# Patient Record
Sex: MALE | Race: WHITE | Employment: FULL TIME | URBAN - METROPOLITAN AREA
[De-identification: names, ages, dates, MRNs, and addresses within clinical notes are randomized per-mention and may not be internally consistent; named-entity substitution may affect disease eponyms.]

---

## 2024-01-20 ENCOUNTER — OFFICE VISIT (OUTPATIENT)
Dept: URGENT CARE | Age: 49
End: 2024-01-20
Payer: COMMERCIAL

## 2024-01-20 VITALS
WEIGHT: 195 LBS | HEART RATE: 106 BPM | RESPIRATION RATE: 20 BRPM | BODY MASS INDEX: 27.92 KG/M2 | SYSTOLIC BLOOD PRESSURE: 145 MMHG | TEMPERATURE: 98.3 F | DIASTOLIC BLOOD PRESSURE: 78 MMHG | HEIGHT: 70 IN | OXYGEN SATURATION: 97 %

## 2024-01-20 DIAGNOSIS — K08.89 TOOTHACHE: Primary | ICD-10-CM

## 2024-01-20 PROCEDURE — G0382 LEV 3 HOSP TYPE B ED VISIT: HCPCS | Performed by: PHYSICIAN ASSISTANT

## 2024-01-20 RX ORDER — AMOXICILLIN AND CLAVULANATE POTASSIUM 875; 125 MG/1; MG/1
1 TABLET, FILM COATED ORAL EVERY 12 HOURS SCHEDULED
Qty: 20 TABLET | Refills: 0 | Status: SHIPPED | OUTPATIENT
Start: 2024-01-20 | End: 2024-01-30

## 2024-01-20 NOTE — PROGRESS NOTES
"[facility]        NAME: Moises Dunn is a 48 y.o. male  : 1975    MRN: [id number]  DATE: 2024  TIME: 11:17 AM    /78   Pulse (!) 106   Temp 98.3 °F (36.8 °C)   Resp 20   Ht 5' 10\" (1.778 m)   Wt 88.5 kg (195 lb)   SpO2 97%   BMI 27.98 kg/m²     Assessment and Plan   Toothache [K08.89]  1. Toothache  amoxicillin-clavulanate (AUGMENTIN) 875-125 mg per tablet            Patient Instructions       Follow up with PCP in 3-5 days.  Proceed to  ER if symptoms worsen.    Chief Complaint     Chief Complaint   Patient presents with    Dental Pain     Dental pain began yesterday         History of Present Illness       Pt with left lower dental pain and jaw swelling x 2-3 days    Dental Pain         Review of Systems   Review of Systems   Constitutional: Negative.    HENT:  Positive for dental problem.    Eyes: Negative.    Respiratory: Negative.     Cardiovascular: Negative.    Gastrointestinal: Negative.    Endocrine: Negative.    Genitourinary: Negative.    Musculoskeletal: Negative.    Skin: Negative.    Allergic/Immunologic: Negative.    Neurological: Negative.    Hematological: Negative.    Psychiatric/Behavioral: Negative.     All other systems reviewed and are negative.        Current Medications       Current Outpatient Medications:     amoxicillin-clavulanate (AUGMENTIN) 875-125 mg per tablet, Take 1 tablet by mouth every 12 (twelve) hours for 10 days, Disp: 20 tablet, Rfl: 0    Current Allergies     Allergies as of 2024    (No Known Allergies)            The following portions of the patient's history were reviewed and updated as appropriate: allergies, current medications, past family history, past medical history, past social history, past surgical history and problem list.     History reviewed. No pertinent past medical history.    History reviewed. No pertinent surgical history.    History reviewed. No pertinent family history.      Medications have been " "verified.        Objective   /78   Pulse (!) 106   Temp 98.3 °F (36.8 °C)   Resp 20   Ht 5' 10\" (1.778 m)   Wt 88.5 kg (195 lb)   SpO2 97%   BMI 27.98 kg/m²        Physical Exam     Physical Exam  Vitals and nursing note reviewed.   Constitutional:       Appearance: Normal appearance. He is normal weight.   HENT:      Head: Normocephalic and atraumatic.      Right Ear: Tympanic membrane, ear canal and external ear normal.      Left Ear: Tympanic membrane, ear canal and external ear normal.      Nose: Nose normal.      Mouth/Throat:      Mouth: Mucous membranes are moist.      Pharynx: Oropharynx is clear.      Comments: Left lower molar tender  gum pain and swelling  ,+jaw swelling left, floor mouth wnl no neck pain or swelling no trismus voice wnl   Eyes:      Extraocular Movements: Extraocular movements intact.      Conjunctiva/sclera: Conjunctivae normal.      Pupils: Pupils are equal, round, and reactive to light.   Cardiovascular:      Rate and Rhythm: Normal rate and regular rhythm.      Heart sounds: Normal heart sounds.   Pulmonary:      Effort: Pulmonary effort is normal.      Breath sounds: Normal breath sounds.   Musculoskeletal:         General: Normal range of motion.      Cervical back: Normal range of motion and neck supple.   Skin:     General: Skin is warm.      Capillary Refill: Capillary refill takes less than 2 seconds.   Neurological:      Mental Status: He is alert and oriented to person, place, and time.                     "

## 2024-06-15 ENCOUNTER — HOSPITAL ENCOUNTER (INPATIENT)
Facility: HOSPITAL | Age: 49
LOS: 2 days | Discharge: HOME | DRG: 174 | End: 2024-06-17
Attending: EMERGENCY MEDICINE | Admitting: INTERNAL MEDICINE
Payer: COMMERCIAL

## 2024-06-15 ENCOUNTER — OFFICE VISIT (OUTPATIENT)
Dept: URGENT CARE | Age: 49
End: 2024-06-15

## 2024-06-15 ENCOUNTER — APPOINTMENT (EMERGENCY)
Dept: RADIOLOGY | Facility: HOSPITAL | Age: 49
DRG: 174 | End: 2024-06-15
Payer: COMMERCIAL

## 2024-06-15 VITALS
DIASTOLIC BLOOD PRESSURE: 124 MMHG | HEART RATE: 104 BPM | RESPIRATION RATE: 22 BRPM | SYSTOLIC BLOOD PRESSURE: 176 MMHG | OXYGEN SATURATION: 100 %

## 2024-06-15 DIAGNOSIS — R07.9 CHEST PAIN, UNSPECIFIED TYPE: Primary | ICD-10-CM

## 2024-06-15 DIAGNOSIS — R00.0 TACHYCARDIA: ICD-10-CM

## 2024-06-15 DIAGNOSIS — I21.4 NSTEMI (NON-ST ELEVATED MYOCARDIAL INFARCTION) (HCC): ICD-10-CM

## 2024-06-15 DIAGNOSIS — R79.89 ELEVATED TROPONIN: ICD-10-CM

## 2024-06-15 DIAGNOSIS — I16.1 HYPERTENSIVE EMERGENCY: ICD-10-CM

## 2024-06-15 DIAGNOSIS — I21.02 ST ELEVATION MYOCARDIAL INFARCTION INVOLVING LEFT ANTERIOR DESCENDING (LAD) CORONARY ARTERY (HCC): ICD-10-CM

## 2024-06-15 DIAGNOSIS — R07.9 CHEST PAIN: Primary | ICD-10-CM

## 2024-06-15 PROBLEM — D72.829 LEUKOCYTOSIS: Status: ACTIVE | Noted: 2024-06-15

## 2024-06-15 PROBLEM — Z72.0 TOBACCO ABUSE: Status: ACTIVE | Noted: 2024-06-15

## 2024-06-15 PROBLEM — R74.01 ELEVATED AST (SGOT): Status: ACTIVE | Noted: 2024-06-15

## 2024-06-15 LAB
2HR DELTA HS TROPONIN: 4655 NG/L
4HR DELTA HS TROPONIN: ABNORMAL NG/L
ALBUMIN SERPL BCP-MCNC: 5.2 G/DL (ref 3.5–5)
ALP SERPL-CCNC: 84 U/L (ref 34–104)
ALT SERPL W P-5'-P-CCNC: 21 U/L (ref 7–52)
ANION GAP SERPL CALCULATED.3IONS-SCNC: 10 MMOL/L (ref 4–13)
AST SERPL W P-5'-P-CCNC: 61 U/L (ref 13–39)
BASOPHILS # BLD AUTO: 0.04 THOUSANDS/ÂΜL (ref 0–0.1)
BASOPHILS NFR BLD AUTO: 0 % (ref 0–1)
BILIRUB SERPL-MCNC: 1.47 MG/DL (ref 0.2–1)
BUN SERPL-MCNC: 18 MG/DL (ref 5–25)
CALCIUM SERPL-MCNC: 10.4 MG/DL (ref 8.4–10.2)
CARDIAC TROPONIN I PNL SERPL HS: 2154 NG/L
CARDIAC TROPONIN I PNL SERPL HS: 6809 NG/L
CARDIAC TROPONIN I PNL SERPL HS: ABNORMAL NG/L
CHLORIDE SERPL-SCNC: 97 MMOL/L (ref 96–108)
CO2 SERPL-SCNC: 26 MMOL/L (ref 21–32)
CREAT SERPL-MCNC: 1.03 MG/DL (ref 0.6–1.3)
CRP SERPL HS-MCNC: 7.91 MG/L
EOSINOPHIL # BLD AUTO: 0.05 THOUSAND/ÂΜL (ref 0–0.61)
EOSINOPHIL NFR BLD AUTO: 0 % (ref 0–6)
ERYTHROCYTE [DISTWIDTH] IN BLOOD BY AUTOMATED COUNT: 13.2 % (ref 11.6–15.1)
GFR SERPL CREATININE-BSD FRML MDRD: 85 ML/MIN/1.73SQ M
GLUCOSE SERPL-MCNC: 122 MG/DL (ref 65–140)
HCT VFR BLD AUTO: 51 % (ref 36.5–49.3)
HGB BLD-MCNC: 17.8 G/DL (ref 12–17)
IMM GRANULOCYTES # BLD AUTO: 0.07 THOUSAND/UL (ref 0–0.2)
IMM GRANULOCYTES NFR BLD AUTO: 0 % (ref 0–2)
KCT BLD-ACNC: 154 SEC (ref 89–137)
KCT BLD-ACNC: 281 SEC (ref 89–137)
LYMPHOCYTES # BLD AUTO: 2.34 THOUSANDS/ÂΜL (ref 0.6–4.47)
LYMPHOCYTES NFR BLD AUTO: 13 % (ref 14–44)
MCH RBC QN AUTO: 31.9 PG (ref 26.8–34.3)
MCHC RBC AUTO-ENTMCNC: 34.9 G/DL (ref 31.4–37.4)
MCV RBC AUTO: 91 FL (ref 82–98)
MONOCYTES # BLD AUTO: 1.01 THOUSAND/ÂΜL (ref 0.17–1.22)
MONOCYTES NFR BLD AUTO: 6 % (ref 4–12)
NEUTROPHILS # BLD AUTO: 14.64 THOUSANDS/ÂΜL (ref 1.85–7.62)
NEUTS SEG NFR BLD AUTO: 81 % (ref 43–75)
NRBC BLD AUTO-RTO: 0 /100 WBCS
PLATELET # BLD AUTO: 315 THOUSANDS/UL (ref 149–390)
PMV BLD AUTO: 10.8 FL (ref 8.9–12.7)
POTASSIUM SERPL-SCNC: 4 MMOL/L (ref 3.5–5.3)
PROT SERPL-MCNC: 8.8 G/DL (ref 6.4–8.4)
RBC # BLD AUTO: 5.58 MILLION/UL (ref 3.88–5.62)
SODIUM SERPL-SCNC: 133 MMOL/L (ref 135–147)
SPECIMEN SOURCE: ABNORMAL
SPECIMEN SOURCE: ABNORMAL
WBC # BLD AUTO: 18.15 THOUSAND/UL (ref 4.31–10.16)

## 2024-06-15 PROCEDURE — G0383 LEV 4 HOSP TYPE B ED VISIT: HCPCS | Performed by: EMERGENCY MEDICINE

## 2024-06-15 PROCEDURE — 93454 CORONARY ARTERY ANGIO S&I: CPT | Performed by: INTERNAL MEDICINE

## 2024-06-15 PROCEDURE — C1725 CATH, TRANSLUMIN NON-LASER: HCPCS | Performed by: INTERNAL MEDICINE

## 2024-06-15 PROCEDURE — 92941 PRQ TRLML REVSC TOT OCCL AMI: CPT | Performed by: INTERNAL MEDICINE

## 2024-06-15 PROCEDURE — NC001 PR NO CHARGE: Performed by: INTERNAL MEDICINE

## 2024-06-15 PROCEDURE — 99285 EMERGENCY DEPT VISIT HI MDM: CPT

## 2024-06-15 PROCEDURE — 84484 ASSAY OF TROPONIN QUANT: CPT | Performed by: EMERGENCY MEDICINE

## 2024-06-15 PROCEDURE — 93005 ELECTROCARDIOGRAM TRACING: CPT

## 2024-06-15 PROCEDURE — 36415 COLL VENOUS BLD VENIPUNCTURE: CPT | Performed by: EMERGENCY MEDICINE

## 2024-06-15 PROCEDURE — 96376 TX/PRO/DX INJ SAME DRUG ADON: CPT

## 2024-06-15 PROCEDURE — 96368 THER/DIAG CONCURRENT INF: CPT

## 2024-06-15 PROCEDURE — 99152 MOD SED SAME PHYS/QHP 5/>YRS: CPT | Performed by: INTERNAL MEDICINE

## 2024-06-15 PROCEDURE — 85025 COMPLETE CBC W/AUTO DIFF WBC: CPT | Performed by: EMERGENCY MEDICINE

## 2024-06-15 PROCEDURE — B2111ZZ FLUOROSCOPY OF MULTIPLE CORONARY ARTERIES USING LOW OSMOLAR CONTRAST: ICD-10-PCS | Performed by: INTERNAL MEDICINE

## 2024-06-15 PROCEDURE — 85347 COAGULATION TIME ACTIVATED: CPT

## 2024-06-15 PROCEDURE — 80053 COMPREHEN METABOLIC PANEL: CPT | Performed by: EMERGENCY MEDICINE

## 2024-06-15 PROCEDURE — C1769 GUIDE WIRE: HCPCS | Performed by: INTERNAL MEDICINE

## 2024-06-15 PROCEDURE — 027034Z DILATION OF CORONARY ARTERY, ONE ARTERY WITH DRUG-ELUTING INTRALUMINAL DEVICE, PERCUTANEOUS APPROACH: ICD-10-PCS | Performed by: INTERNAL MEDICINE

## 2024-06-15 PROCEDURE — 96365 THER/PROPH/DIAG IV INF INIT: CPT

## 2024-06-15 PROCEDURE — 99223 1ST HOSP IP/OBS HIGH 75: CPT | Performed by: PHYSICIAN ASSISTANT

## 2024-06-15 PROCEDURE — 96366 THER/PROPH/DIAG IV INF ADDON: CPT

## 2024-06-15 PROCEDURE — 71045 X-RAY EXAM CHEST 1 VIEW: CPT

## 2024-06-15 PROCEDURE — C9606 PERC D-E COR REVASC W AMI S: HCPCS | Performed by: INTERNAL MEDICINE

## 2024-06-15 PROCEDURE — C1887 CATHETER, GUIDING: HCPCS | Performed by: INTERNAL MEDICINE

## 2024-06-15 PROCEDURE — 99291 CRITICAL CARE FIRST HOUR: CPT | Performed by: EMERGENCY MEDICINE

## 2024-06-15 PROCEDURE — 86141 C-REACTIVE PROTEIN HS: CPT | Performed by: EMERGENCY MEDICINE

## 2024-06-15 PROCEDURE — 96375 TX/PRO/DX INJ NEW DRUG ADDON: CPT

## 2024-06-15 PROCEDURE — C1874 STENT, COATED/COV W/DEL SYS: HCPCS | Performed by: INTERNAL MEDICINE

## 2024-06-15 PROCEDURE — 83036 HEMOGLOBIN GLYCOSYLATED A1C: CPT | Performed by: PHYSICIAN ASSISTANT

## 2024-06-15 PROCEDURE — 99153 MOD SED SAME PHYS/QHP EA: CPT | Performed by: INTERNAL MEDICINE

## 2024-06-15 DEVICE — STENT ONYXNG25034UX ONYX 2.50X34RX
Type: IMPLANTABLE DEVICE | Site: CORONARY | Status: FUNCTIONAL
Brand: ONYX FRONTIER™

## 2024-06-15 RX ORDER — ACETAMINOPHEN 325 MG/1
650 TABLET ORAL EVERY 4 HOURS PRN
Status: DISCONTINUED | OUTPATIENT
Start: 2024-06-15 | End: 2024-06-17 | Stop reason: HOSPADM

## 2024-06-15 RX ORDER — ASPIRIN 81 MG/1
81 TABLET, CHEWABLE ORAL DAILY
Status: DISCONTINUED | OUTPATIENT
Start: 2024-06-16 | End: 2024-06-17 | Stop reason: HOSPADM

## 2024-06-15 RX ORDER — DOCUSATE SODIUM 100 MG/1
100 CAPSULE, LIQUID FILLED ORAL 2 TIMES DAILY
Status: DISCONTINUED | OUTPATIENT
Start: 2024-06-15 | End: 2024-06-17 | Stop reason: HOSPADM

## 2024-06-15 RX ORDER — METOPROLOL TARTRATE 1 MG/ML
5 INJECTION, SOLUTION INTRAVENOUS ONCE
Status: COMPLETED | OUTPATIENT
Start: 2024-06-15 | End: 2024-06-15

## 2024-06-15 RX ORDER — HEPARIN SODIUM 1000 [USP'U]/ML
4000 INJECTION, SOLUTION INTRAVENOUS; SUBCUTANEOUS ONCE
Status: COMPLETED | OUTPATIENT
Start: 2024-06-15 | End: 2024-06-15

## 2024-06-15 RX ORDER — HEPARIN SODIUM 1000 [USP'U]/ML
2000 INJECTION, SOLUTION INTRAVENOUS; SUBCUTANEOUS EVERY 6 HOURS PRN
Status: DISCONTINUED | OUTPATIENT
Start: 2024-06-15 | End: 2024-06-15

## 2024-06-15 RX ORDER — ATORVASTATIN CALCIUM 80 MG/1
80 TABLET, FILM COATED ORAL ONCE
Status: COMPLETED | OUTPATIENT
Start: 2024-06-15 | End: 2024-06-15

## 2024-06-15 RX ORDER — ONDANSETRON 2 MG/ML
4 INJECTION INTRAMUSCULAR; INTRAVENOUS ONCE
Status: DISCONTINUED | OUTPATIENT
Start: 2024-06-15 | End: 2024-06-15

## 2024-06-15 RX ORDER — CLOPIDOGREL BISULFATE 75 MG/1
600 TABLET ORAL ONCE
Status: COMPLETED | OUTPATIENT
Start: 2024-06-15 | End: 2024-06-15

## 2024-06-15 RX ORDER — ONDANSETRON 2 MG/ML
4 INJECTION INTRAMUSCULAR; INTRAVENOUS EVERY 6 HOURS PRN
Status: DISCONTINUED | OUTPATIENT
Start: 2024-06-15 | End: 2024-06-17 | Stop reason: HOSPADM

## 2024-06-15 RX ORDER — NITROGLYCERIN 0.4 MG/1
0.4 TABLET SUBLINGUAL ONCE
Status: COMPLETED | OUTPATIENT
Start: 2024-06-15 | End: 2024-06-15

## 2024-06-15 RX ORDER — FLUTICASONE PROPIONATE 50 MCG
2 SPRAY, SUSPENSION (ML) NASAL CONTINUOUS PRN
COMMUNITY

## 2024-06-15 RX ORDER — ATORVASTATIN CALCIUM 80 MG/1
80 TABLET, FILM COATED ORAL EVERY EVENING
Status: DISCONTINUED | OUTPATIENT
Start: 2024-06-15 | End: 2024-06-17 | Stop reason: HOSPADM

## 2024-06-15 RX ORDER — ACETAMINOPHEN 325 MG/1
650 TABLET ORAL EVERY 6 HOURS PRN
Status: DISCONTINUED | OUTPATIENT
Start: 2024-06-15 | End: 2024-06-15

## 2024-06-15 RX ORDER — SODIUM CHLORIDE 9 MG/ML
100 INJECTION, SOLUTION INTRAVENOUS CONTINUOUS
Status: DISPENSED | OUTPATIENT
Start: 2024-06-15 | End: 2024-06-16

## 2024-06-15 RX ORDER — HEPARIN SODIUM 1000 [USP'U]/ML
4000 INJECTION, SOLUTION INTRAVENOUS; SUBCUTANEOUS EVERY 6 HOURS PRN
Status: DISCONTINUED | OUTPATIENT
Start: 2024-06-15 | End: 2024-06-15

## 2024-06-15 RX ORDER — VERAPAMIL HYDROCHLORIDE 2.5 MG/ML
INJECTION, SOLUTION INTRAVENOUS CODE/TRAUMA/SEDATION MEDICATION
Status: DISCONTINUED | OUTPATIENT
Start: 2024-06-15 | End: 2024-06-15 | Stop reason: HOSPADM

## 2024-06-15 RX ORDER — ENOXAPARIN SODIUM 100 MG/ML
40 INJECTION SUBCUTANEOUS DAILY
Status: DISCONTINUED | OUTPATIENT
Start: 2024-06-16 | End: 2024-06-17 | Stop reason: HOSPADM

## 2024-06-15 RX ORDER — NICOTINE 21 MG/24HR
1 PATCH, TRANSDERMAL 24 HOURS TRANSDERMAL DAILY
Status: DISCONTINUED | OUTPATIENT
Start: 2024-06-16 | End: 2024-06-17 | Stop reason: HOSPADM

## 2024-06-15 RX ORDER — LIDOCAINE HYDROCHLORIDE 10 MG/ML
INJECTION, SOLUTION EPIDURAL; INFILTRATION; INTRACAUDAL; PERINEURAL CODE/TRAUMA/SEDATION MEDICATION
Status: DISCONTINUED | OUTPATIENT
Start: 2024-06-15 | End: 2024-06-15 | Stop reason: HOSPADM

## 2024-06-15 RX ORDER — HEPARIN SODIUM 1000 [USP'U]/ML
INJECTION, SOLUTION INTRAVENOUS; SUBCUTANEOUS CODE/TRAUMA/SEDATION MEDICATION
Status: DISCONTINUED | OUTPATIENT
Start: 2024-06-15 | End: 2024-06-15 | Stop reason: HOSPADM

## 2024-06-15 RX ORDER — HEPARIN SODIUM 10000 [USP'U]/100ML
3-20 INJECTION, SOLUTION INTRAVENOUS
Status: DISCONTINUED | OUTPATIENT
Start: 2024-06-15 | End: 2024-06-15

## 2024-06-15 RX ORDER — NITROGLYCERIN 20 MG/100ML
INJECTION INTRAVENOUS CODE/TRAUMA/SEDATION MEDICATION
Status: DISCONTINUED | OUTPATIENT
Start: 2024-06-15 | End: 2024-06-15 | Stop reason: HOSPADM

## 2024-06-15 RX ORDER — NITROGLYCERIN 20 MG/100ML
5-200 INJECTION INTRAVENOUS
Status: DISCONTINUED | OUTPATIENT
Start: 2024-06-15 | End: 2024-06-15

## 2024-06-15 RX ORDER — FAMOTIDINE 10 MG/ML
20 INJECTION, SOLUTION INTRAVENOUS ONCE
Status: COMPLETED | OUTPATIENT
Start: 2024-06-15 | End: 2024-06-15

## 2024-06-15 RX ORDER — FEXOFENADINE HCL 60 MG/1
30 TABLET, FILM COATED ORAL DAILY
COMMUNITY

## 2024-06-15 RX ORDER — MIDAZOLAM HYDROCHLORIDE 2 MG/2ML
INJECTION, SOLUTION INTRAMUSCULAR; INTRAVENOUS CODE/TRAUMA/SEDATION MEDICATION
Status: DISCONTINUED | OUTPATIENT
Start: 2024-06-15 | End: 2024-06-15 | Stop reason: HOSPADM

## 2024-06-15 RX ORDER — FENTANYL CITRATE 50 UG/ML
INJECTION, SOLUTION INTRAMUSCULAR; INTRAVENOUS CODE/TRAUMA/SEDATION MEDICATION
Status: DISCONTINUED | OUTPATIENT
Start: 2024-06-15 | End: 2024-06-15 | Stop reason: HOSPADM

## 2024-06-15 RX ORDER — MORPHINE SULFATE 4 MG/ML
4 INJECTION, SOLUTION INTRAMUSCULAR; INTRAVENOUS ONCE
Status: COMPLETED | OUTPATIENT
Start: 2024-06-15 | End: 2024-06-15

## 2024-06-15 RX ADMIN — METOROPROLOL TARTRATE 5 MG: 5 INJECTION, SOLUTION INTRAVENOUS at 18:58

## 2024-06-15 RX ADMIN — FAMOTIDINE 20 MG: 10 INJECTION, SOLUTION INTRAVENOUS at 17:07

## 2024-06-15 RX ADMIN — CLOPIDOGREL BISULFATE 600 MG: 75 TABLET ORAL at 18:53

## 2024-06-15 RX ADMIN — METOPROLOL TARTRATE 25 MG: 25 TABLET, FILM COATED ORAL at 18:02

## 2024-06-15 RX ADMIN — NITROGLYCERIN 0.4 MG: 0.4 TABLET SUBLINGUAL at 17:06

## 2024-06-15 RX ADMIN — METOROPROLOL TARTRATE 5 MG: 5 INJECTION, SOLUTION INTRAVENOUS at 18:45

## 2024-06-15 RX ADMIN — DOCUSATE SODIUM 100 MG: 100 CAPSULE, LIQUID FILLED ORAL at 22:03

## 2024-06-15 RX ADMIN — SODIUM CHLORIDE 100 ML/HR: 0.9 INJECTION, SOLUTION INTRAVENOUS at 22:03

## 2024-06-15 RX ADMIN — METOPROLOL TARTRATE 25 MG: 25 TABLET, FILM COATED ORAL at 22:03

## 2024-06-15 RX ADMIN — NITROGLYCERIN 1 INCH: 20 OINTMENT TOPICAL at 18:07

## 2024-06-15 RX ADMIN — MORPHINE SULFATE 4 MG: 4 INJECTION INTRAVENOUS at 19:11

## 2024-06-15 RX ADMIN — NITROGLYCERIN 5 MCG/MIN: 20 INJECTION INTRAVENOUS at 18:38

## 2024-06-15 RX ADMIN — ATORVASTATIN CALCIUM 80 MG: 80 TABLET, FILM COATED ORAL at 22:03

## 2024-06-15 RX ADMIN — METOROPROLOL TARTRATE 5 MG: 5 INJECTION, SOLUTION INTRAVENOUS at 19:22

## 2024-06-15 RX ADMIN — ATORVASTATIN CALCIUM 80 MG: 80 TABLET, FILM COATED ORAL at 18:02

## 2024-06-15 RX ADMIN — HEPARIN SODIUM 4000 UNITS: 1000 INJECTION INTRAVENOUS; SUBCUTANEOUS at 18:11

## 2024-06-15 RX ADMIN — HEPARIN SODIUM 11.1 UNITS/KG/HR: 10000 INJECTION, SOLUTION INTRAVENOUS at 18:20

## 2024-06-15 NOTE — Clinical Note
Case was discussed with Dr. Esquivel and the patient's admission status was agreed to be Admission Status: inpatient status to the service of Dr. Esquivel .

## 2024-06-15 NOTE — ED PROVIDER NOTES
History  Chief Complaint   Patient presents with    Chest Pain     Pt reports CP began at 1pm. In center of chest that shoots to shoulders. States some SOB. Hx hypertension.      47 yo M with HTN, c/o onset of mid sternal chest pain, since about 1pm, persistent since onset, associated with elevated blood pressure.  Onset doing exertional work in his garage.   He denies fever, chills, N/V.  He went to [facility] first, and was referred to ED.  He took aspirin PTA .       History provided by:  Patient  Chest Pain      Prior to Admission Medications   Prescriptions Last Dose Informant Patient Reported? Taking?   fexofenadine (ALLEGRA) 60 MG tablet Past Week Self Yes Yes   Sig: Take 30 mg by mouth daily   fluticasone (FLONASE) 50 mcg/act nasal spray Past Week  Yes Yes   Si sprays into each nostril continuous as needed for rhinitis   ibuprofen (ADVIL,MOTRIN) 100 MG tablet Past Week  Yes Yes   Sig: Take 100 mg by mouth continuous as needed for mild pain      Facility-Administered Medications: None       History reviewed. No pertinent past medical history.    History reviewed. No pertinent surgical history.    History reviewed. No pertinent family history.  I have reviewed and agree with the history as documented.    E-Cigarette/Vaping     E-Cigarette/Vaping Substances     Social History     Tobacco Use    Smoking status: Some Days     Types: Cigarettes, Cigars    Smokeless tobacco: Never   Substance Use Topics    Alcohol use: Yes    Drug use: Never       Review of Systems   Cardiovascular:  Positive for chest pain.       Physical Exam  Physical Exam  Vitals and nursing note reviewed.   Constitutional:       Appearance: He is well-developed. He is diaphoretic (on his forehead).      Comments: BP elevated   HENT:      Head: Normocephalic and atraumatic.      Right Ear: External ear normal.      Left Ear: External ear normal.      Nose: Nose normal.      Mouth/Throat:      Mouth: Mucous membranes are moist.   Eyes:       Conjunctiva/sclera: Conjunctivae normal.      Pupils: Pupils are equal, round, and reactive to light.   Cardiovascular:      Rate and Rhythm: Normal rate.   Pulmonary:      Effort: Pulmonary effort is normal.   Abdominal:      Tenderness: There is no abdominal tenderness.   Musculoskeletal:         General: Normal range of motion.      Cervical back: Normal range of motion and neck supple.   Skin:     General: Skin is warm.      Capillary Refill: Capillary refill takes less than 2 seconds.   Neurological:      Mental Status: He is alert and oriented to person, place, and time.      Cranial Nerves: No cranial nerve deficit.      Coordination: Coordination normal.   Psychiatric:         Behavior: Behavior normal.         Thought Content: Thought content normal.         Judgment: Judgment normal.         Vital Signs  ED Triage Vitals   Temperature Pulse Respirations Blood Pressure SpO2   06/15/24 1715 06/15/24 1656 06/15/24 1656 06/15/24 1700 06/15/24 1656   98.3 °F (36.8 °C) 95 22 (!) 183/127 97 %      Temp Source Heart Rate Source Patient Position - Orthostatic VS BP Location FiO2 (%)   06/15/24 1715 06/15/24 1656 06/15/24 1700 06/15/24 1700 --   Oral Monitor Lying Right arm       Pain Score       06/15/24 1911       5           Vitals:    06/15/24 1950 06/15/24 1955 06/15/24 2000 06/15/24 2120   BP: 145/85 141/88 142/84 134/87   Pulse: 71 73 69 78   Patient Position - Orthostatic VS:             Visual Acuity      ED Medications  Medications   sodium chloride 0.9 % infusion (has no administration in time range)   acetaminophen (TYLENOL) tablet 650 mg (has no administration in time range)   aspirin chewable tablet 81 mg (has no administration in time range)   atorvastatin (LIPITOR) tablet 80 mg (has no administration in time range)   metoprolol tartrate (LOPRESSOR) tablet 25 mg (has no administration in time range)   nicotine (NICODERM CQ) 14 mg/24hr TD 24 hr patch 1 patch (has no administration in time range)    enoxaparin (LOVENOX) subcutaneous injection 40 mg (has no administration in time range)   docusate sodium (COLACE) capsule 100 mg (has no administration in time range)   ondansetron (ZOFRAN) injection 4 mg (has no administration in time range)   ticagrelor (BRILINTA) tablet 90 mg (has no administration in time range)   nitroglycerin (NITROSTAT) SL tablet 0.4 mg (0.4 mg Sublingual Given 6/15/24 1706)   Famotidine (PF) (PEPCID) injection 20 mg (20 mg Intravenous Given 6/15/24 1707)   metoprolol tartrate (LOPRESSOR) tablet 25 mg (25 mg Oral Given 6/15/24 1802)   atorvastatin (LIPITOR) tablet 80 mg (80 mg Oral Given 6/15/24 1802)   nitroglycerin (NITRO-BID) 2 % TD ointment 1 inch (1 inch Topical Given 6/15/24 1807)   heparin (porcine) injection 4,000 Units (4,000 Units Intravenous Given 6/15/24 1811)   clopidogrel (PLAVIX) tablet 600 mg (600 mg Oral Given 6/15/24 1853)   metoprolol (LOPRESSOR) injection 5 mg (5 mg Intravenous Given 6/15/24 1845)   metoprolol (LOPRESSOR) injection 5 mg (5 mg Intravenous Given 6/15/24 1858)   morphine injection 4 mg (4 mg Intravenous Given 6/15/24 1911)   metoprolol (LOPRESSOR) injection 5 mg (5 mg Intravenous Given 6/15/24 1922)       Diagnostic Studies  Results Reviewed       Procedure Component Value Units Date/Time    HS Troponin I 2hr [[id number]]  (Abnormal) Collected: 06/15/24 1905    Lab Status: Final result Specimen: Blood from Arm, Left Updated: 06/15/24 1938     hs TnI 2hr 6,809 ng/L      Delta 2hr hsTnI 4,655 ng/L     HS Troponin I 4hr [[id number]]     Lab Status: No result Specimen: Blood     High sensitivity CRP [[id number]] Collected: 06/15/24 1709    Lab Status: In process Specimen: Blood from Arm, Right Updated: 06/15/24 1748    HS Troponin 0hr (reflex protocol) [[id number]]  (Abnormal) Collected: 06/15/24 1709    Lab Status: Final result Specimen: Blood from Arm, Right Updated: 06/15/24 1740     hs TnI 0hr 2,154 ng/L     Comprehensive metabolic panel [[id number]]   (Abnormal) Collected: 06/15/24 1709    Lab Status: Final result Specimen: Blood from Arm, Right Updated: 06/15/24 1736     Sodium 133 mmol/L      Potassium 4.0 mmol/L      Chloride 97 mmol/L      CO2 26 mmol/L      ANION GAP 10 mmol/L      BUN 18 mg/dL      Creatinine 1.03 mg/dL      Glucose 122 mg/dL      Calcium 10.4 mg/dL      AST 61 U/L      ALT 21 U/L      Alkaline Phosphatase 84 U/L      Total Protein 8.8 g/dL      Albumin 5.2 g/dL      Total Bilirubin 1.47 mg/dL      eGFR 85 ml/min/1.73sq m     Narrative:      National Kid[employer] Disease Foundation guidelines for Chronic Kidney Disease (CKD):     Stage 1 with normal or high GFR (GFR > 90 mL/min/1.73 square meters)    Stage 2 Mild CKD (GFR = 60-89 mL/min/1.73 square meters)    Stage 3A Moderate CKD (GFR = 45-59 mL/min/1.73 square meters)    Stage 3B Moderate CKD (GFR = 30-44 mL/min/1.73 square meters)    Stage 4 Severe CKD (GFR = 15-29 mL/min/1.73 square meters)    Stage 5 End Stage CKD (GFR <15 mL/min/1.73 square meters)  Note: GFR calculation is accurate only with a steady state creatinine    CBC and differential [[id number]]  (Abnormal) Collected: 06/15/24 1709    Lab Status: Final result Specimen: Blood from Arm, Right Updated: 06/15/24 1717     WBC 18.15 Thousand/uL      RBC 5.58 Million/uL      Hemoglobin 17.8 g/dL      Hematocrit 51.0 %      MCV 91 fL      MCH 31.9 pg      MCHC 34.9 g/dL      RDW 13.2 %      MPV 10.8 fL      Platelets 315 Thousands/uL      nRBC 0 /100 WBCs      Segmented % 81 %      Immature Grans % 0 %      Lymphocytes % 13 %      Monocytes % 6 %      Eosinophils Relative 0 %      Basophils Relative 0 %      Absolute Neutrophils 14.64 Thousands/µL      Absolute Immature Grans 0.07 Thousand/uL      Absolute Lymphocytes 2.34 Thousands/µL      Absolute Monocytes 1.01 Thousand/µL      Eosinophils Absolute 0.05 Thousand/µL      Basophils Absolute 0.04 Thousands/µL                    XR chest 1 view portable   Final Result by Jocelynn Gallardo,  MD (06/15 1944)      No acute cardiopulmonary disease.            Workstation performed: [id number]                    Procedures  ECG 12 Lead Documentation Only    Date/Time: 6/15/2024 4:58 PM    Performed by: Bebeto Baca MD  Authorized by: Bebeto Baca MD    Rate:     ECG rate:  100  Rhythm:     Rhythm: sinus rhythm    Ectopy:     Ectopy: none    QRS:     QRS axis:  Normal    QRS intervals:  Normal  Conduction:     Conduction: normal    ST segments:     ST segments:  Abnormal    Elevation:  V2, V3 and V5  ECG 12 Lead Documentation Only    Date/Time: 6/15/2024 6:00 PM    Performed by: Bebeto Baca MD  Authorized by: Bebeto Baca MD    Interpretation:     Interpretation: abnormal    Rate:     ECG rate:  88  Ectopy:     Ectopy: none    ST segments:     ST segments:  Abnormal    Elevation:  V2, V3, V5 and V4  ECG 12 Lead Documentation Only    Date/Time: 6/15/2024 6:30 PM    Performed by: Bebeto Baca MD  Authorized by: Bebeto Baca MD    Interpretation:     Interpretation: abnormal    Rate:     ECG rate:  83    ECG rate assessment: normal    ST segments:     ST segments:  Abnormal    Elevation:  V2, V4 and V5  ECG 12 Lead Documentation Only    Date/Time: 6/15/2024 7:00 PM    Performed by: Bebeto Baca MD  Authorized by: Bebeto Baca MD    Interpretation:     Interpretation: abnormal    Rate:     ECG rate:  78    ECG rate assessment: normal    Rhythm:     Rhythm: sinus rhythm    Conduction:     Conduction: abnormal    Q waves:     Q waves:  V2, V3, V4 and V5  CriticalCare Time    Date/Time: 6/15/2024 7:54 PM    Performed by: Beebto Baca MD  Authorized by: Bebeto Baca MD    Critical care provider statement:     Critical care time (minutes):  60    Critical care time was exclusive of:  Separately billable procedures and treating other patients and teaching time    Critical care was time spent personally by me on the following activities:   Blood draw for specimens, obtaining history from patient or surrogate, development of treatment plan with patient or surrogate, discussions with consultants, evaluation of patient's response to treatment, examination of patient, interpretation of cardiac output measurements, ordering and performing treatments and interventions, ordering and review of laboratory studies, ordering and review of radiographic studies, re-evaluation of patient's condition and review of old charts    I assumed direction of critical care for this patient from another provider in my specialty: no      Conscious Sedation Assessment      Flowsheet Row Classification Score   ASA Scale Assessment 4-Severe incapacitating disease process that is a constant threat to life filed at 06/15/2024 2016   Mallampati Classification Class I: soft palate, uvula, fauces, pillars visible - No Difficulty filed at 06/15/2024 2016               ED Course  ED Course as of 06/15/24 2151   Sat Mihir 15, 2024   1709 EKG reviewed by Dr. Navarro--agreed EKG has concerning findings, asked that I review with Interventional Cardiologist   1739 XR chest 1 view portable  My independent interpretation of imaging:   No acute findings      1746 hs TnI 0hr(!): 2,154   1753 EKG sent to Dr. Esquivel, who reviewed and said no STEMI alert.  I also updated him with troponin, and Dr. Navarro has joined the conversation, and asked Dr. Esquivel review.     1800 Patient reported some improvement in chest pain, but no resolution     1830 Chest pain unchanged   1838 Dr. Esquivel is giving additional recommendations for nitro gtt, plavix, lopressor IV.       1900 BP was improving, increasing again, will titrate NTG, give addition lopressor . He looks more uncomfortable . EKG showing persistent ST changes   1919 Updated Dr. Esquivel that patient experiencing increasing chest pain, he reviewed next EKG, and BP is increasing again, he is activating the cath lab.     1952 hs TnI 2hr(!):  6,809  Increased as expected                                               Medical Decision Making  Amount and/or Complexity of Data Reviewed  Labs: ordered. Decision-making details documented in ED Course.  Radiology: ordered. Decision-making details documented in ED Course.    Risk  Prescription drug management.             Disposition  Final diagnoses:   Chest pain   NSTEMI (non-ST elevated myocardial infarction) (HCC)   Hypertensive emergency   Elevated troponin     Time reflects when diagnosis was documented in both MDM as applicable and the Disposition within this note       Time User Action Codes Description Comment    6/15/2024  5:57 PM Bebeto Baca Add [R07.9] Chest pain     6/15/2024  6:55 PM Bebeto Baca Add [I21.4] NSTEMI (non-ST elevated myocardial infarction) (HCC)     6/15/2024  6:56 PM Bebeto Baca Add [I16.1] Hypertensive emergency     6/15/2024  7:22 PM Bebeto Baca [R79.89] Elevated troponin           ED Disposition       ED Disposition   Send to Cath Lab    Condition   --    Date/Time   Sat Mihir 15, 2024 1927    Comment   Case was discussed with Dr. Esquivel and the patient's admission status was agreed to be Admission Status: inpatient status to the service of Dr. Esquivel .               Follow-up Information    None         Current Discharge Medication List        CONTINUE these medications which have NOT CHANGED    Details   fexofenadine (ALLEGRA) 60 MG tablet Take 30 mg by mouth daily      fluticasone (FLONASE) 50 mcg/act nasal spray 2 sprays into each nostril continuous as needed for rhinitis      ibuprofen (ADVIL,MOTRIN) 100 MG tablet Take 100 mg by mouth continuous as needed for mild pain             No discharge procedures on file.    PDMP Review       None            ED Provider  Electronically Signed by             Bebeto Baca MD  06/15/24 0887

## 2024-06-15 NOTE — PROGRESS NOTES
[facility]        NAME: Moises Dunn is a 48 y.o. male  : 1975    MRN: [id number]  DATE: Yanni 15, 2024  TIME: 4:43 PM    Assessment and Plan   Chest pain, unspecified type [R07.9]  1. Chest pain, unspecified type  ECG 12 lead    Transfer to other facility      2. Tachycardia          Twelve-lead EKG revealed sinus tachycardia at a ventricular of 104 with otherwise normal axis, intervals, and borderline ST elevations noted in V1 through V4 without associated reciprocal changes.  No prior for comparison.  Does not meet STEMI criteria but is concerning for ischemia.  Strongly advised and encouraged transport to the ED via ambulance for further evaluation and workup which patient declined.  Patient states that he is here with his mother who will take him via POV to [facility] emergency room.  I informed patient of the risks of going to the ED via POV in an unmonitored setting to include the risk of cardiac arrhythmia, cardiac arrest, permanent disability and death.  Patient voiced understanding of these risks, and demonstrated medical capacity to make informed decision.  AMA paperwork signed.  He was otherwise alert and oriented to person place time and event. He was hypertensive but otherwise hemodynamically stable and able to ambulate without difficulty.  Call-ahead  given to [facility] emergency room where voiced clinical concern given history and exam findings.      Patient Instructions       Follow up with PCP in 3-5 days.  Proceed to  ER if symptoms worsen.    If tests have been performed at [facility], our office will contact you with results if changes need to be made to the care plan discussed with you at the visit.  You can review your full results on [facility].    Chief Complaint     Chief Complaint   Patient presents with    Chest Pain     Pt has had chest pain that started about 1pm today. States his BP was high at home, has been under a lot of stress  "recently          History of Present Illness       40-year-old male with history of hypertension, hyperlipidemia, prior methamphetamine use presents with chief complaint of midsternal chest pain with radiation to his upper back and bilateral shoulders which began approximately 4 hours ago while at rest.  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  He also endorses associated generalized malaise.  He denies any recent methamphetamine or substance use.  He states that he feels \"cold\".  He denies any shortness of breath, nausea, vomiting lightheadedness, palpitations or syncope.  Denies abdominal pain.    Chest Pain   This is a recurrent problem. The current episode started today. The onset quality is sudden. The problem occurs constantly. The problem has been gradually worsening. The pain is present in the substernal region. The pain is at a severity of 8/10. The pain is severe. The quality of the pain is described as pressure. The pain radiates to the mid back, right shoulder and left shoulder. Associated symptoms include diaphoresis. Pertinent negatives include no abdominal pain, back pain, claudication, cough, dizziness, exertional chest pressure, fever, headaches, hemoptysis, irregular heartbeat, leg pain, lower extremity edema, malaise/fatigue, nausea, near-syncope, numbness, orthopnea, palpitations, PND, shortness of breath, sputum production, syncope, vomiting or weakness.   Pertinent negatives for past medical history include no seizures.       Review of Systems   Review of Systems   Constitutional:  Positive for diaphoresis and fatigue. Negative for activity change, appetite change, chills, fever, malaise/fatigue and unexpected weight change.   HENT:  Negative for congestion, dental problem, drooling, ear discharge, ear pain, facial swelling, hearing loss, mouth sores, nosebleeds, postnasal drip, rhinorrhea, sinus pressure, sinus " pain, sneezing, sore throat, tinnitus, trouble swallowing and voice change.    Eyes:  Negative for pain and visual disturbance.   Respiratory:  Negative for apnea, cough, hemoptysis, sputum production, choking, chest tightness, shortness of breath, wheezing and stridor.    Cardiovascular:  Positive for chest pain. Negative for palpitations, orthopnea, claudication, leg swelling, syncope, PND and near-syncope.   Gastrointestinal:  Negative for abdominal distention, abdominal pain, anal bleeding, blood in stool, constipation, diarrhea, nausea, rectal pain and vomiting.   Genitourinary:  Negative for dysuria and hematuria.   Musculoskeletal:  Negative for arthralgias, back pain, gait problem, joint swelling, myalgias, neck pain and neck stiffness.   Skin:  Negative for color change and rash.   Neurological:  Negative for dizziness, tremors, seizures, syncope, facial asymmetry, speech difficulty, weakness, light-headedness, numbness and headaches.   All other systems reviewed and are negative.        Current Medications     No current outpatient medications on file.    Current Allergies     Allergies as of 06/15/2024    (No Known Allergies)            The following portions of the patient's history were reviewed and updated as appropriate: allergies, current medications, past family history, past medical history, past social history, past surgical history and problem list.     No past medical history on file.    No past surgical history on file.    No family history on file.      Medications have been verified.        Objective   BP (!) 176/124   Pulse 104   Resp 22   SpO2 100%   No LMP for male patient.       Physical Exam     Physical Exam  Vitals and nursing note reviewed.   Constitutional:       General: He is not in acute distress.     Appearance: Normal appearance. He is diaphoretic. He is not ill-appearing or toxic-appearing.   HENT:      Head: Normocephalic and atraumatic.      Right Ear: External ear normal.       Left Ear: External ear normal.      Nose: Nose normal.      Mouth/Throat:      Mouth: Mucous membranes are moist.      Pharynx: Oropharynx is clear. No oropharyngeal exudate.   Eyes:      Extraocular Movements: Extraocular movements intact.      Pupils: Pupils are equal, round, and reactive to light.   Neck:      Thyroid: No thyromegaly.      Vascular: No hepatojugular reflux or JVD.      Trachea: No tracheal deviation.   Cardiovascular:      Rate and Rhythm: Regular rhythm. Tachycardia present.      Pulses: Normal pulses.      Heart sounds: Normal heart sounds.   Pulmonary:      Effort: Pulmonary effort is normal. No tachypnea, accessory muscle usage or respiratory distress.      Breath sounds: Normal breath sounds. No stridor. No decreased breath sounds, wheezing, rhonchi or rales.   Chest:      Chest wall: No tenderness.   Abdominal:      General: Abdomen is flat. There is no distension or abdominal bruit.      Palpations: There is no fluid wave, hepatomegaly, splenomegaly or mass.      Tenderness: There is no abdominal tenderness. There is no right CVA tenderness, left CVA tenderness, guarding or rebound.      Hernia: No hernia is present.   Musculoskeletal:         General: No swelling, tenderness, deformity or signs of injury.      Cervical back: Normal range of motion and neck supple.      Left lower leg: No edema.   Lymphadenopathy:      Cervical: No cervical adenopathy.   Skin:     General: Skin is warm.      Capillary Refill: Capillary refill takes less than 2 seconds.      Coloration: Skin is not cyanotic or pale.      Findings: No ecchymosis, erythema or rash.      Nails: There is no clubbing.   Neurological:      General: No focal deficit present.      Mental Status: He is alert and oriented to person, place, and time.      Cranial Nerves: No cranial nerve deficit.      Motor: No weakness.      Coordination: Coordination normal.      Gait: Gait normal.      Deep Tendon Reflexes: Reflexes normal.    Psychiatric:         Behavior: Behavior normal.

## 2024-06-15 NOTE — PROGRESS NOTES
Physician informed patient it is medically necessary to go to the ED via ambulance due to his symptomology and abnormal ECG; patient refused EMS and signed AMA form

## 2024-06-16 ENCOUNTER — APPOINTMENT (INPATIENT)
Dept: NON INVASIVE DIAGNOSTICS | Facility: HOSPITAL | Age: 49
DRG: 174 | End: 2024-06-16
Payer: COMMERCIAL

## 2024-06-16 ENCOUNTER — APPOINTMENT (INPATIENT)
Dept: ULTRASOUND IMAGING | Facility: HOSPITAL | Age: 49
DRG: 174 | End: 2024-06-16
Payer: COMMERCIAL

## 2024-06-16 LAB
ALBUMIN SERPL BCP-MCNC: 3.9 G/DL (ref 3.5–5)
ALP SERPL-CCNC: 58 U/L (ref 34–104)
ALT SERPL W P-5'-P-CCNC: 51 U/L (ref 7–52)
ANION GAP SERPL CALCULATED.3IONS-SCNC: 7 MMOL/L (ref 4–13)
AORTIC ROOT: 3.5 CM
AORTIC VALVE MEAN VELOCITY: 9.1 M/S
APICAL FOUR CHAMBER EJECTION FRACTION: 58 %
ASCENDING AORTA: 3.3 CM
AST SERPL W P-5'-P-CCNC: 310 U/L (ref 13–39)
ATRIAL RATE: 100 BPM
ATRIAL RATE: 66 BPM
ATRIAL RATE: 70 BPM
ATRIAL RATE: 71 BPM
ATRIAL RATE: 76 BPM
ATRIAL RATE: 78 BPM
ATRIAL RATE: 83 BPM
ATRIAL RATE: 88 BPM
AV LVOT MEAN GRADIENT: 3 MMHG
AV LVOT PEAK GRADIENT: 4 MMHG
AV MEAN GRADIENT: 4 MMHG
AV PEAK GRADIENT: 7 MMHG
AV VELOCITY RATIO: 0.83
BASOPHILS # BLD AUTO: 0.06 THOUSANDS/ÂΜL (ref 0–0.1)
BASOPHILS NFR BLD AUTO: 0 % (ref 0–1)
BILIRUB DIRECT SERPL-MCNC: 0.23 MG/DL (ref 0–0.2)
BILIRUB SERPL-MCNC: 1.35 MG/DL (ref 0.2–1)
BSA FOR ECHO PROCEDURE: 2.17 M2
BUN SERPL-MCNC: 15 MG/DL (ref 5–25)
CALCIUM SERPL-MCNC: 9.1 MG/DL (ref 8.4–10.2)
CARDIAC TROPONIN I PNL SERPL HS: ABNORMAL NG/L (ref 8–18)
CHLORIDE SERPL-SCNC: 101 MMOL/L (ref 96–108)
CHOLEST SERPL-MCNC: 163 MG/DL
CO2 SERPL-SCNC: 25 MMOL/L (ref 21–32)
CREAT SERPL-MCNC: 0.78 MG/DL (ref 0.6–1.3)
DOP CALC AO PEAK VEL: 1.27 M/S
DOP CALC AO VTI: 24.84 CM
DOP CALC LVOT PEAK VEL VTI: 19.75 CM
DOP CALC LVOT PEAK VEL: 1.05 M/S
E WAVE DECELERATION TIME: 203 MS
E/A RATIO: 1.65
EOSINOPHIL # BLD AUTO: 0.31 THOUSAND/ÂΜL (ref 0–0.61)
EOSINOPHIL NFR BLD AUTO: 2 % (ref 0–6)
ERYTHROCYTE [DISTWIDTH] IN BLOOD BY AUTOMATED COUNT: 13.3 % (ref 11.6–15.1)
EST. AVERAGE GLUCOSE BLD GHB EST-MCNC: 105 MG/DL
FRACTIONAL SHORTENING: 29 (ref 28–44)
GFR SERPL CREATININE-BSD FRML MDRD: 106 ML/MIN/1.73SQ M
GLUCOSE SERPL-MCNC: 113 MG/DL (ref 65–140)
HAV IGM SER QL: NORMAL
HBA1C MFR BLD: 5.3 %
HBV CORE IGM SER QL: NORMAL
HBV SURFACE AG SER QL: NORMAL
HCT VFR BLD AUTO: 43.2 % (ref 36.5–49.3)
HCV AB SER QL: NORMAL
HDLC SERPL-MCNC: 52 MG/DL
HGB BLD-MCNC: 15 G/DL (ref 12–17)
IMM GRANULOCYTES # BLD AUTO: 0.08 THOUSAND/UL (ref 0–0.2)
IMM GRANULOCYTES NFR BLD AUTO: 1 % (ref 0–2)
INR PPP: 1.03 (ref 0.84–1.19)
INTERVENTRICULAR SEPTUM IN DIASTOLE (PARASTERNAL SHORT AXIS VIEW): 1.6 CM
INTERVENTRICULAR SEPTUM: 1.6 CM (ref 0.6–1.1)
LAAS-AP2: 19.3 CM2
LAAS-AP4: 19.1 CM2
LDLC SERPL CALC-MCNC: 86 MG/DL (ref 0–100)
LEFT ATRIUM SIZE: 4.4 CM
LEFT ATRIUM VOLUME (MOD BIPLANE): 56 ML
LEFT ATRIUM VOLUME INDEX (MOD BIPLANE): 25.8 ML/M2
LEFT INTERNAL DIMENSION IN SYSTOLE: 3.6 CM (ref 2.1–4)
LEFT VENTRICLE DIASTOLIC VOLUME (MOD BIPLANE): 122 ML
LEFT VENTRICLE DIASTOLIC VOLUME INDEX (MOD BIPLANE): 56.2 ML/M2
LEFT VENTRICLE SYSTOLIC VOLUME (MOD BIPLANE): 52 ML
LEFT VENTRICLE SYSTOLIC VOLUME INDEX (MOD BIPLANE): 24 ML/M2
LEFT VENTRICULAR INTERNAL DIMENSION IN DIASTOLE: 5.1 CM (ref 3.5–6)
LEFT VENTRICULAR POSTERIOR WALL IN END DIASTOLE: 1.1 CM
LEFT VENTRICULAR STROKE VOLUME: 72 ML
LV EF: 57 %
LVSV (TEICH): 72 ML
LYMPHOCYTES # BLD AUTO: 3.27 THOUSANDS/ÂΜL (ref 0.6–4.47)
LYMPHOCYTES NFR BLD AUTO: 20 % (ref 14–44)
MAGNESIUM SERPL-MCNC: 1.8 MG/DL (ref 1.9–2.7)
MCH RBC QN AUTO: 32.1 PG (ref 26.8–34.3)
MCHC RBC AUTO-ENTMCNC: 34.7 G/DL (ref 31.4–37.4)
MCV RBC AUTO: 92 FL (ref 82–98)
MONOCYTES # BLD AUTO: 1.72 THOUSAND/ÂΜL (ref 0.17–1.22)
MONOCYTES NFR BLD AUTO: 11 % (ref 4–12)
MV E'TISSUE VEL-SEP: 7 CM/S
MV PEAK A VEL: 0.46 M/S
MV PEAK E VEL: 76 CM/S
MV STENOSIS PRESSURE HALF TIME: 59 MS
MV VALVE AREA P 1/2 METHOD: 3.73
NEUTROPHILS # BLD AUTO: 10.64 THOUSANDS/ÂΜL (ref 1.85–7.62)
NEUTS SEG NFR BLD AUTO: 66 % (ref 43–75)
NRBC BLD AUTO-RTO: 0 /100 WBCS
P AXIS: 56 DEGREES
P AXIS: 63 DEGREES
P AXIS: 64 DEGREES
P AXIS: 65 DEGREES
P AXIS: 67 DEGREES
P AXIS: 67 DEGREES
P AXIS: 73 DEGREES
P AXIS: 77 DEGREES
PHOSPHATE SERPL-MCNC: 3.7 MG/DL (ref 2.7–4.5)
PLATELET # BLD AUTO: 233 THOUSANDS/UL (ref 149–390)
PMV BLD AUTO: 10.4 FL (ref 8.9–12.7)
POTASSIUM SERPL-SCNC: 3.5 MMOL/L (ref 3.5–5.3)
PR INTERVAL: 138 MS
PR INTERVAL: 140 MS
PR INTERVAL: 144 MS
PR INTERVAL: 144 MS
PR INTERVAL: 150 MS
PR INTERVAL: 150 MS
PR INTERVAL: 154 MS
PR INTERVAL: 154 MS
PROT SERPL-MCNC: 6.3 G/DL (ref 6.4–8.4)
PROTHROMBIN TIME: 13.7 SECONDS (ref 11.6–14.5)
QRS AXIS: 52 DEGREES
QRS AXIS: 56 DEGREES
QRS AXIS: 56 DEGREES
QRS AXIS: 60 DEGREES
QRS AXIS: 61 DEGREES
QRS AXIS: 65 DEGREES
QRS AXIS: 69 DEGREES
QRS AXIS: 69 DEGREES
QRSD INTERVAL: 74 MS
QRSD INTERVAL: 74 MS
QRSD INTERVAL: 76 MS
QRSD INTERVAL: 80 MS
QRSD INTERVAL: 82 MS
QRSD INTERVAL: 83 MS
QRSD INTERVAL: 84 MS
QRSD INTERVAL: 86 MS
QT INTERVAL: 334 MS
QT INTERVAL: 363 MS
QT INTERVAL: 364 MS
QT INTERVAL: 366 MS
QT INTERVAL: 394 MS
QT INTERVAL: 436 MS
QT INTERVAL: 442 MS
QT INTERVAL: 454 MS
QTC INTERVAL: 409 MS
QTC INTERVAL: 430 MS
QTC INTERVAL: 430 MS
QTC INTERVAL: 440 MS
QTC INTERVAL: 449 MS
QTC INTERVAL: 463 MS
QTC INTERVAL: 473 MS
QTC INTERVAL: 490 MS
RBC # BLD AUTO: 4.68 MILLION/UL (ref 3.88–5.62)
RIGHT ATRIAL 2D VOLUME: 25 ML
RIGHT ATRIUM AREA SYSTOLE A4C: 11.1 CM2
RIGHT VENTRICLE ID DIMENSION: 3.4 CM
SL CV LEFT ATRIUM LENGTH A2C: 5.3 CM
SL CV LV EF: 45
SL CV PED ECHO LEFT VENTRICLE DIASTOLIC VOLUME (MOD BIPLANE) 2D: 125 ML
SL CV PED ECHO LEFT VENTRICLE SYSTOLIC VOLUME (MOD BIPLANE) 2D: 54 ML
SODIUM SERPL-SCNC: 133 MMOL/L (ref 135–147)
T WAVE AXIS: 68 DEGREES
T WAVE AXIS: 70 DEGREES
T WAVE AXIS: 71 DEGREES
T WAVE AXIS: 72 DEGREES
T WAVE AXIS: 72 DEGREES
T WAVE AXIS: 73 DEGREES
T WAVE AXIS: 78 DEGREES
T WAVE AXIS: 78 DEGREES
TR MAX PG: 20 MMHG
TR PEAK VELOCITY: 2.3 M/S
TRICUSPID ANNULAR PLANE SYSTOLIC EXCURSION: 2.3 CM
TRICUSPID VALVE PEAK REGURGITATION VELOCITY: 2.25 M/S
TRIGL SERPL-MCNC: 124 MG/DL
VENTRICULAR RATE: 100 BPM
VENTRICULAR RATE: 66 BPM
VENTRICULAR RATE: 70 BPM
VENTRICULAR RATE: 71 BPM
VENTRICULAR RATE: 76 BPM
VENTRICULAR RATE: 78 BPM
VENTRICULAR RATE: 83 BPM
VENTRICULAR RATE: 88 BPM
WBC # BLD AUTO: 16.08 THOUSAND/UL (ref 4.31–10.16)

## 2024-06-16 PROCEDURE — 80076 HEPATIC FUNCTION PANEL: CPT | Performed by: PHYSICIAN ASSISTANT

## 2024-06-16 PROCEDURE — 80074 ACUTE HEPATITIS PANEL: CPT | Performed by: PHYSICIAN ASSISTANT

## 2024-06-16 PROCEDURE — 84100 ASSAY OF PHOSPHORUS: CPT | Performed by: PHYSICIAN ASSISTANT

## 2024-06-16 PROCEDURE — 93010 ELECTROCARDIOGRAM REPORT: CPT | Performed by: INTERNAL MEDICINE

## 2024-06-16 PROCEDURE — 99254 IP/OBS CNSLTJ NEW/EST MOD 60: CPT | Performed by: INTERNAL MEDICINE

## 2024-06-16 PROCEDURE — 93306 TTE W/DOPPLER COMPLETE: CPT

## 2024-06-16 PROCEDURE — 93306 TTE W/DOPPLER COMPLETE: CPT | Performed by: INTERNAL MEDICINE

## 2024-06-16 PROCEDURE — 80048 BASIC METABOLIC PNL TOTAL CA: CPT | Performed by: PHYSICIAN ASSISTANT

## 2024-06-16 PROCEDURE — 85025 COMPLETE CBC W/AUTO DIFF WBC: CPT | Performed by: PHYSICIAN ASSISTANT

## 2024-06-16 PROCEDURE — 76705 ECHO EXAM OF ABDOMEN: CPT

## 2024-06-16 PROCEDURE — 83735 ASSAY OF MAGNESIUM: CPT | Performed by: PHYSICIAN ASSISTANT

## 2024-06-16 PROCEDURE — 80061 LIPID PANEL: CPT | Performed by: PHYSICIAN ASSISTANT

## 2024-06-16 PROCEDURE — 84484 ASSAY OF TROPONIN QUANT: CPT | Performed by: PHYSICIAN ASSISTANT

## 2024-06-16 PROCEDURE — 85610 PROTHROMBIN TIME: CPT | Performed by: EMERGENCY MEDICINE

## 2024-06-16 PROCEDURE — 99232 SBSQ HOSP IP/OBS MODERATE 35: CPT | Performed by: INTERNAL MEDICINE

## 2024-06-16 RX ADMIN — DOCUSATE SODIUM 100 MG: 100 CAPSULE, LIQUID FILLED ORAL at 17:10

## 2024-06-16 RX ADMIN — PERFLUTREN 0.6 ML/MIN: 6.52 INJECTION, SUSPENSION INTRAVENOUS at 08:33

## 2024-06-16 RX ADMIN — DOCUSATE SODIUM 100 MG: 100 CAPSULE, LIQUID FILLED ORAL at 08:25

## 2024-06-16 RX ADMIN — ENOXAPARIN SODIUM 40 MG: 40 INJECTION SUBCUTANEOUS at 08:23

## 2024-06-16 RX ADMIN — METOPROLOL TARTRATE 25 MG: 25 TABLET, FILM COATED ORAL at 09:52

## 2024-06-16 RX ADMIN — Medication 1 PATCH: at 08:23

## 2024-06-16 RX ADMIN — TICAGRELOR 90 MG: 90 TABLET ORAL at 08:24

## 2024-06-16 RX ADMIN — ATORVASTATIN CALCIUM 80 MG: 80 TABLET, FILM COATED ORAL at 17:09

## 2024-06-16 RX ADMIN — ASPIRIN 81 MG CHEWABLE TABLET 81 MG: 81 TABLET CHEWABLE at 08:23

## 2024-06-16 RX ADMIN — TICAGRELOR 90 MG: 90 TABLET ORAL at 17:10

## 2024-06-16 NOTE — ASSESSMENT & PLAN NOTE
Cardiac cath performed in the setting of a late presenting MI. Anterior Q waves already developed but persistent CP & Elevated troponin.  100 % mid LAD occlusion. Successful PCI (radial approach) with p[acement of a 2.5 x 34 mm MARIA VICTORIA. The proximal part of the stent was posrt dilated to 2.75 mm.     Plan:  ASA 81 mg daily  Brilinta 90 mg BID   Lopressor  ECHO  Secondary risk factor prevention strategies: smoking/alcohol cessation, lifestyle modification

## 2024-06-16 NOTE — PROGRESS NOTES
Cardiology         Progress Note - Cardiology   Moises Dunn 48 y.o. male MRN: [id number]  Unit/Bed#: ICU 14 Encounter: [id number]          Assessment/Recommendations/Discussion:   Late presentation of anterior wall STEMI, status post PCI/MARIA VICTORIA (2.5 x 34 mm) to 100% mid LAD occlusion  Ongoing tobacco use  Hypertension  Dyslipidemia        Patient feeling well now, chest pain-free.  No ongoing symptoms.  Continue dual antiplatelet therapy, high-dose atorvastatin  Will reduce metoprolol to 25 mg p.o. twice daily considering marginally low blood pressure  Add ACE inhibitor or ARB tomorrow as blood pressure allows  Echocardiogram pending.  Preliminary review at bedside reveals mildly reduced left ventricular systolic function  Okay to transfer to telemetry floor later today          Subjective: Patient seen and examined, feels well, denies any further chest discomfort                Physical Exam:  GEN:  NAD  HEENT:  MMM, NCAT, pink conjunctiva, EOMI, nonicteric sclera  CV:  NO JVD/HJR, RR, NO M/R/G, +S1/S2, NO PARASTERNAL HEAVE/THRILL, NO LE EDEMA, NO HEPATIC SYSTOLIC PULSATION, WARM EXTREMITIES  RESP:  CTAB/L  ABD:  SOFT, NT, NO GROSS ORGANOMEGALY        Vitals:   /71   Pulse 73   Temp 98.2 °F (36.8 °C) (Oral)   Resp 19   Wt 99.6 kg (219 lb 9.3 oz)   SpO2 98%   BMI 31.51 kg/m²   Vitals:    06/15/24 2143 06/16/24 0600   Weight: 99.6 kg (219 lb 9.3 oz) 99.6 kg (219 lb 9.3 oz)       Intake/Output Summary (Last 24 hours) at 6/16/2024 0843  Last data filed at 6/16/2024 0810  Gross per 24 hour   Intake 200 ml   Output 500 ml   Net -300 ml       TELEMETRY: Sinus rhythm  Lab Results:  Results from last 7 days   Lab Units 06/16/24  0503   WBC Thousand/uL 16.08*   HEMOGLOBIN g/dL 15.0   HEMATOCRIT % 43.2   PLATELETS Thousands/uL 233     Results from last 7 days   Lab Units 06/16/24  0503   POTASSIUM mmol/L 3.5   CHLORIDE mmol/L 101   CO2 mmol/L 25   BUN mg/dL 15   CREATININE mg/dL 0.78   CALCIUM mg/dL 9.1   ALK  PHOS U/L 58   ALT U/L 51   AST U/L 310*     Results from last 7 days   Lab Units 06/16/24  0503   POTASSIUM mmol/L 3.5   CHLORIDE mmol/L 101   CO2 mmol/L 25   BUN mg/dL 15   CREATININE mg/dL 0.78   CALCIUM mg/dL 9.1           Medications:    Current Facility-Administered Medications:     acetaminophen (TYLENOL) tablet 650 mg, 650 mg, Oral, Q4H PRN, Gino Esquivel MD    aspirin chewable tablet 81 mg, 81 mg, Oral, Daily, Gino Esquivel MD, 81 mg at 06/16/24 0823    atorvastatin (LIPITOR) tablet 80 mg, 80 mg, Oral, QPM, Gino Esquivel MD, 80 mg at 06/15/24 2203    docusate sodium (COLACE) capsule 100 mg, 100 mg, Oral, BID, Emily Adorno PA-C, 100 mg at 06/16/24 0825    enoxaparin (LOVENOX) subcutaneous injection 40 mg, 40 mg, Subcutaneous, Daily, Emily Adorno PA-C, 40 mg at 06/16/24 0823    metoprolol tartrate (LOPRESSOR) tablet 25 mg, 25 mg, Oral, Q6H, Gino Esquivel MD, 25 mg at 06/16/24 0825    nicotine (NICODERM CQ) 14 mg/24hr TD 24 hr patch 1 patch, 1 patch, Transdermal, Daily, Emily Adorno PA-C, 1 patch at 06/16/24 0823    ondansetron (ZOFRAN) injection 4 mg, 4 mg, Intravenous, Q6H PRN, Emily Adorno PA-C    ticagrelor (BRILINTA) tablet 90 mg, 90 mg, Oral, BID, Emily Adorno PA-C, 90 mg at 06/16/24 0824    This note was completed in part utilizing [employer] Direct Software.  Grammatical errors, random word insertions, spelling mistakes, and incomplete sentences may be an occasional consequence of this system secondary to software limitations, ambient noise, and hardware issues.  If you have any questions or concerns about the content, text, or information contained within the body of this dictation, please contact the provider for clarification.

## 2024-06-16 NOTE — ASSESSMENT & PLAN NOTE
Trend. Bili 1.47. Denies abdominal pain.   Reports social ETOH use.   Obtain RUQ US. Outpatient GI eval

## 2024-06-16 NOTE — PLAN OF CARE
Problem: Potential for Falls  Goal: Patient will remain free of falls  Description: INTERVENTIONS:  - Educate patient/family on patient safety including physical limitations  - Instruct patient to call for assistance with activity   - Consult OT/PT to assist with strengthening/mobility   - Keep Call bell within reach  - Keep bed low and locked with side rails adjusted as appropriate  - Keep care items and personal belongings within reach  - Initiate and maintain comfort rounds  - Make Fall Risk Sign visible to staff  - Offer Toileting every 2 Hours, in advance of need  - Initiate/Maintain bedalarm  Problem: Knowledge Deficit  Goal: Patient/family/caregiver demonstrates understanding of disease process, treatment plan, medications, and discharge instructions  Description: Complete learning assessment and assess knowledge base.  Interventions:  - Provide teaching at level of understanding  - Provide teaching via preferred learning methods  Reactivated     Problem: CARDIOVASCULAR - ADULT  Goal: Maintains optimal cardiac output and hemodynamic stability  Description: INTERVENTIONS:  - Monitor I/O, vital signs and rhythm  - Monitor for S/S and trends of decreased cardiac output  - Administer and titrate ordered vasoactive medications to optimize hemodynamic stability  - Assess quality of pulses, skin color and temperature  - Assess for signs of decreased coronary artery perfusion  - Instruct patient to report change in severity of symptoms  Reactivated     - Apply yellow socks and bracelet for high fall risk patients  - Consider moving patient to room near nurses station  Reactivated     Problem: DISCHARGE PLANNING  Goal: Discharge to home or other facility with appropriate resources  Description: INTERVENTIONS:  - Identify barriers to discharge w/patient and caregiver  - Arrange for needed discharge resources and transportation as appropriate  - Identify discharge learning needs (meds, wound care, etc.)  - Arrange for  interpretive services to assist at discharge as needed  - Refer to Case Management Department for coordinating discharge planning if the patient needs post-hospital services based on physician/advanced practitioner order or complex needs related to functional status, cognitive ability, or social support system  Reactivated

## 2024-06-16 NOTE — ASSESSMENT & PLAN NOTE
Suspect reactive in setting on NSTEMI  CXR: (-) acute    Plan:  Surveillance off antibiotics  Trend WBC/temps

## 2024-06-16 NOTE — ASSESSMENT & PLAN NOTE
Suspect reactive in setting on NSTEMI  F/u am WBC  CXR: (-) acute    Plan:  Surveillance off antibiotics  Trend WBC/temps

## 2024-06-16 NOTE — ASSESSMENT & PLAN NOTE
Trend. F/u am hepatic function panel.  Bili 1.47. Denies abdominal pain.   Obtain acute hepatitis panel  Reports social ETOH use.   Obtain RUQ US. Outpatient GI eval

## 2024-06-16 NOTE — PROGRESS NOTES
[facility]  Progress Note  Name: Moises Dunn I  MRN: [id number]  Unit/Bed#: ICU 14 I Date of Admission: 6/15/2024   Date of Service: 6/16/2024 I Hospital Day: 1    Assessment & Plan   * NSTEMI (non-ST elevated myocardial infarction) (HCC)  Assessment & Plan  Cardiac cath performed in the setting of a late presenting MI. Anterior Q waves already developed but persistent CP & Elevated troponin.  100 % mid LAD occlusion. Successful PCI (radial approach) with p[acement of a 2.5 x 34 mm MARIA VICTORIA. The proximal part of the stent was posrt dilated to 2.75 mm.     Plan:  ASA 81 mg daily  Brilinta 90 mg BID  Atorvastatin. F/u lipid panel   Lopressor  ECHO  Secondary risk factor prevention strategies: smoking/alcohol cessation, lifestyle modification. Check A1C    Leukocytosis  Assessment & Plan  Suspect reactive in setting on NSTEMI  F/u am WBC  CXR: (-) acute    Plan:  Surveillance off antibiotics  Trend WBC/temps    Tobacco abuse  Assessment & Plan  1/2 ppd.  Smoking cessation education. Patient requesting nicotine patch.     Elevated AST (SGOT)  Assessment & Plan  Trend. F/u am hepatic function panel.  Bili 1.47. Denies abdominal pain.   Obtain acute hepatitis panel  Reports social ETOH use.   Obtain RUQ US. Outpatient GI eval             Disposition: Stepdown Level 1    ICU Core Measures     A: Assess, Prevent, and Manage Pain Has pain been assessed? Yes  Need for changes to pain regimen? NA   B: Both SAT/SAT  N/A   C: Choice of Sedation RASS Goal: 0 Alert and Calm  Need for changes to sedation or analgesia regimen? NA   D: Delirium CAM-ICU: Negative   E: Early Mobility  Plan for early mobility? Yes   F: Family Engagement Plan for family engagement today? Yes         Prophylaxis:  VTE VTE covered by:  enoxaparin, Subcutaneous       Stress Ulcer  not ordered         Significant 24hr Events     24hr events: 48 y.o. h/o HTN/remote methamphetamine/tobacco use use was referred from urgent care for  katya in ED for chest pain that began around noon today (6/15). In ED, he was found to have an abnormal EKG and upon case review with cardiology, he was taken to the cardiac cath lab where he was found to have 100% mid LAD occlusion. A successful PCI was performed (radial approach) w/ placement of a MARIA VICTORIA to the LAD. Most catherization, he was transferred to the the ICU, HD stable. No overnight events.      Subjective   Review of Systems: See HPI for Review of Systems     Objective                            Vitals I/O      Most Recent Min/Max in 24hrs   Temp 98.3 °F (36.8 °C) Temp  Min: 98.3 °F (36.8 °C)  Max: 98.3 °F (36.8 °C)   Pulse 70 Pulse  Min: 67  Max: 111   Resp 19 Resp  Min: 13  Max: 22   /86 BP  Min: 134/87  Max: 183/127   O2 Sat 99 % SpO2  Min: 97 %  Max: 100 %      Intake/Output Summary (Last 24 hours) at 6/16/2024 0028  Last data filed at 6/15/2024 2200  Gross per 24 hour   Intake --   Output 100 ml   Net -100 ml       Diet Cardiovascular; Cardiac    Invasive Monitoring           Physical Exam   Physical Exam  Vitals reviewed.   Eyes:      Pupils: Pupils are equal, round, and reactive to light.   Skin:     General: Skin is warm and dry.   HENT:      Head: Normocephalic.      Mouth/Throat:      Mouth: Mucous membranes are moist.   Cardiovascular:      Rate and Rhythm: Normal rate and regular rhythm.      Pulses: Normal pulses.   Musculoskeletal:         General: Normal range of motion.   Abdominal:      Palpations: Abdomen is soft.   Constitutional:       General: He is not in acute distress.     Appearance: He is well-developed. He is not toxic-appearing.   Pulmonary:      Effort: Pulmonary effort is normal.   Neurological:      General: No focal deficit present.      Mental Status: He is alert.            Diagnostic Studies      EKG: NSR  Imaging:  I have personally reviewed pertinent reports.       Medications:  Scheduled PRN   aspirin, 81 mg, Daily  atorvastatin, 80 mg, QPM  docusate sodium, 100  mg, BID  enoxaparin, 40 mg, Daily  metoprolol tartrate, 25 mg, Q6H  nicotine, 1 patch, Daily  ticagrelor, 90 mg, BID      acetaminophen, 650 mg, Q4H PRN  ondansetron, 4 mg, Q6H PRN       Continuous    sodium chloride, 100 mL/hr, Last Rate: 100 mL/hr (06/15/24 2203)         Labs:    CBC    Recent Labs     06/15/24  1709   WBC 18.15*   HGB 17.8*   HCT 51.0*        BMP    Recent Labs     06/15/24  1709   SODIUM 133*   K 4.0   CL 97   CO2 26   AGAP 10   BUN 18   CREATININE 1.03   CALCIUM 10.4*       Coags    No recent results     Additional Electrolytes  No recent results       Blood Gas    No recent results  No recent results LFTs  Recent Labs     06/15/24  1709   ALT 21   AST 61*   ALKPHOS 84   ALB 5.2*   TBILI 1.47*       Infectious  No recent results  Glucose  Recent Labs     06/15/24  1709   GLUC 122               Emily Adorno PA-C

## 2024-06-16 NOTE — PROGRESS NOTES
Cardiac cath performed in the setting of a late presenting MI. Anterior Q waves already developed but persistent CP.  Elevated troponin.    100 % mid LAD occlusion. Successful PCI with p[acement of a 2.5 x 34 mm MARIA VICTORIA. The proximal part of the stent was posrt dilated to 2.75 mm.    ASA 81 mg daily  Brilinta 90 mg BID   Lopressor  ECHO

## 2024-06-16 NOTE — CONSULTS
Cardiology Consult  06/16/24    Referring Physian: MD NABIL Akins    Chief Complain/Reason for Referal:     Assessment/Recommendations/Discussion:   Late presentation of anterior wall STEMI, status post PCI/MARIA VICTORIA (2.5 x 34 mm) to 100% mid LAD occlusion  Ongoing tobacco use  Hypertension  Dyslipidemia           Patient feeling well now, chest pain-free.  No ongoing symptoms.  Continue dual antiplatelet therapy, high-dose atorvastatin  Will reduce metoprolol to 25 mg p.o. twice daily considering marginally low blood pressure  Add ACE inhibitor or ARB tomorrow as blood pressure allows  Echocardiogram pending.  Preliminary review at bedside reveals mildly reduced left ventricular systolic function  Okay to transfer to telemetry floor later today           ======================================================    HPI:  I am seeing this patient in cardiology consultation for: Chest pain    Moises Dunn is a 48 y.o. male who started developing chest pain around 1 PM yesterday.  Initially thought it was due to his high blood pressure and did not come to the ER as his discomfort slightly improved.  Subsequently over the next couple of hours increased significantly which led him to present to urgent care.  He was sent over to the ER from urgent care where he was noted to have anterior ST elevations.  The case was discussed with Dr. Bentley over the phone and initially it was thought to manage the patient medically given his uncontrolled hypertension and elevated heart rates.  Despite medical treatment with metoprolol and nitroglycerin infusion, his symptoms did not improve significantly.  An MI alert was called and cardiac authorization revealed 100% mid LAD occlusion.  He underwent PCI/MARIA VICTORIA with a 2.5 x 34 mm with good results.    At this time he is in the ICU and feels well without any ongoing chest discomfort.  Denies dyspnea, palpitations, lightheadedness, dizziness, lower extremity edema, orthopnea.  He smokes  although has been cutting down lately.        History reviewed. No pertinent past medical history.      Scheduled Meds:  Current Facility-Administered Medications   Medication Dose Route Frequency Provider Last Rate    acetaminophen  650 mg Oral Q4H PRN Gino Esquivel MD      aspirin  81 mg Oral Daily Gino Esquivel MD      atorvastatin  80 mg Oral QPM Gino Esquivel MD      docusate sodium  100 mg Oral BID Emily Adorno PA-C      enoxaparin  40 mg Subcutaneous Daily Emily Adorno PA-C      metoprolol tartrate  25 mg Oral BID Rujul Navarro, DO      nicotine  1 patch Transdermal Daily Emily Adorno PA-C      ondansetron  4 mg Intravenous Q6H PRN Emily Adorno PA-C      ticagrelor  90 mg Oral BID Emily Adorno PA-C       Continuous Infusions:   PRN Meds:.  acetaminophen    ondansetron  No Active Allergies  I reviewed the Home Medication list in the chart.     History reviewed. No pertinent family history.    Social History     Socioeconomic History    Marital status:      Spouse name: Not on file    Number of children: Not on file    Years of education: Not on file    Highest education level: Not on file   Occupational History    Not on file   Tobacco Use    Smoking status: Some Days     Types: Cigarettes, Cigars    Smokeless tobacco: Never   Substance and Sexual Activity    Alcohol use: Yes    Drug use: Never    Sexual activity: Not on file   Other Topics Concern    Not on file   Social History Narrative    Not on file     Social Determinants of Health     Financial Resource Strain: Not on file   Food Insecurity: Not on file   Transportation Needs: Not on file   Physical Activity: Not on file   Stress: Not on file   Social Connections: Not on file   Intimate Partner Violence: Not on file   Housing Stability: Not on file       Review of Systems - as per HPI, all others reviewed and negative  Vitals:    06/16/24 0825   BP: 114/71   Pulse: 73   Resp:    Temp:    SpO2:      I/O         06/14  "0701  06/15 0700 06/15 0701 06/16 0700 06/16 0701 06/17 0700    P.O.   200    Total Intake(mL/kg)   200 (2)    Urine (mL/kg/hr)  500     Total Output  500     Net  -500 +200                 Weight (last 2 days)       Date/Time Weight    06/16/24 0600 99.6 (219.58)    06/15/24 2143 99.6 (219.58)    06/15/24 1656 100 (220.46)            GEN: NAD, Alert  HEENT: Mucus membranes moist, pink conjunctivae  EYES: Pupils equal, sclera anicteric  NECK: No JVD/HJR, no carotid bruit  CARDIOVASCULAR: RRR, No murmur, rub, gallops S1,S2, no parasternal heave/thrill  LUNGS: Clear To auscultation bilaterally  ABDOMEN: Soft, nondistended, no hepatic systolic pulsation  EXTREMITIES/VASCULAR: No edema.  PSYCH: Normal Affect by limited examination  NEURO: Grossly intact by limited examination    HEME: No significant bleeding, bruising, petechia by limited examination  SKIN: No significant rashes by limited examination  MSK:  Normal upper extremity and trunk strengths by limited examination      EKG: Sinus rhythm, anterior ST elevations  TELE: Sinus rhythm, NSVT  CXR: No acute disease      Results from last 7 days   Lab Units 06/16/24  0503 06/15/24  1709   WBC Thousand/uL 16.08* 18.15*   HEMOGLOBIN g/dL 15.0 17.8*   HEMATOCRIT % 43.2 51.0*   PLATELETS Thousands/uL 233 315   SEGS PCT % 66 81*   MONO PCT % 11 6   EOS PCT % 2 0     Results from last 7 days   Lab Units 06/16/24  0503 06/15/24  1709   POTASSIUM mmol/L 3.5 4.0   CHLORIDE mmol/L 101 97   CO2 mmol/L 25 26   BUN mg/dL 15 18   CREATININE mg/dL 0.78 1.03   CALCIUM mg/dL 9.1 10.4*     Results from last 7 days   Lab Units 06/16/24  0503 06/15/24  1709   POTASSIUM mmol/L 3.5 4.0   CHLORIDE mmol/L 101 97   CO2 mmol/L 25 26   BUN mg/dL 15 18   CREATININE mg/dL 0.78 1.03   CALCIUM mg/dL 9.1 10.4*   ALK PHOS U/L 58 84   ALT U/L 51 21   AST U/L 310* 61*     No results found for: \"TROPONINT\"              Results from last 7 days   Lab Units 06/16/24  0138   INR  1.03               I have " "personally reviewed the EKG, CXR and Telemetry images directly.      Patient Active Problem List    Diagnosis Date Noted    NSTEMI (non-ST elevated myocardial infarction) (HCC) 06/15/2024    Tobacco abuse 06/15/2024    Leukocytosis 06/15/2024    Elevated AST (SGOT) 06/15/2024       Portions of the record may have been created with voice recognition software. Occasional wrong word or \"sound a like\" substitutions may have occurred due to the inherent limitations of voice recognition software. Read the chart carefully and recognize, using context, where substitutions have occurred.        "

## 2024-06-16 NOTE — H&P
Critical access hospital  H&P  Name: Moises Dunn 48 y.o. male I MRN: [id number]  Unit/Bed#: ICU 14 I Date of Admission: 6/15/2024   Date of Service: 6/15/2024 I Hospital Day: 0      Assessment & Plan   * NSTEMI (non-ST elevated myocardial infarction) (HCC)  Assessment & Plan  Cardiac cath performed in the setting of a late presenting MI. Anterior Q waves already developed but persistent CP & Elevated troponin.  100 % mid LAD occlusion. Successful PCI (radial approach) with p[acement of a 2.5 x 34 mm MARIA VICTORIA. The proximal part of the stent was posrt dilated to 2.75 mm.     Plan:  ASA 81 mg daily  Brilinta 90 mg BID   Lopressor  ECHO  Secondary risk factor prevention strategies: smoking/alcohol cessation, lifestyle modification    Leukocytosis  Assessment & Plan  Suspect reactive in setting on NSTEMI  CXR: (-) acute    Plan:  Surveillance off antibiotics  Trend WBC/temps    Tobacco abuse  Assessment & Plan  1/2 ppd.  Smoking cessation education. Patient requesting nicotine patch.     Elevated AST (SGOT)  Assessment & Plan  Trend. Bili 1.47. Denies abdominal pain.   Reports social ETOH use.   Obtain RUQ US. Outpatient GI eval           History of Present Illness     HPI: Moises Dunn is a 48 y.o. h/o HTN/remote methamphetamine/tobacco use use was referred from urgent care for eval in ED for chest pain that began around noon today (6/15). In ED, he was found to have an abnormal EKG and upon case review with cardiology, he was taken to the cardiac cath lab where he was found to have 100% mid LAD occlusion. A successful PCI was performed (radial approach) w/ placement of a MARIA VICTORIA to the LAD. Most catherization, he was transferred to the the ICU, HD stable    History obtained from chart review and the patient.  Review of Systems: Review of Systems  Disposition: Stepdown Level 1  Historical Information   No past medical history on file. No past surgical history on file.   Current Outpatient Medications    Medication Instructions    fexofenadine (ALLEGRA) 30 mg, Oral, Daily    fluticasone (FLONASE) 50 mcg/act nasal spray 2 sprays, Nasal, Continuous PRN    ibuprofen (ADVIL,MOTRIN) 100 mg, Oral, Continuous PRN    No Active Allergies   Social History     Tobacco Use    Smoking status: Some Days     Types: Cigarettes, Cigars    Smokeless tobacco: Never   Substance Use Topics    Alcohol use: Yes    Drug use: Never    History reviewed. No pertinent family history.       Objective                            Vitals I/O      Most Recent Min/Max in 24hrs   Temp 98.3 °F (36.8 °C) Temp  Min: 98.3 °F (36.8 °C)  Max: 98.3 °F (36.8 °C)   Pulse 78 Pulse  Min: 67  Max: 111   Resp 13 Resp  Min: 13  Max: 22   /87 BP  Min: 134/87  Max: 183/127   O2 Sat 99 % SpO2  Min: 97 %  Max: 100 %    No intake or output data in the 24 hours ending 06/15/24 3319    Diet Cardiovascular; Cardiac    Invasive Monitoring           Physical Exam   Physical Exam  Vitals and nursing note reviewed.   Eyes:      Pupils: Pupils are equal, round, and reactive to light.   Skin:     General: Skin is warm and dry.   HENT:      Head: Normocephalic and atraumatic.      Mouth/Throat:      Mouth: Mucous membranes are moist.   Neck:      Vascular: Central line present.   Cardiovascular:      Rate and Rhythm: Normal rate and regular rhythm.      Pulses: Normal pulses. Pulses are Right radial TR band in place.   Musculoskeletal:         General: Normal range of motion.   Abdominal:      Palpations: Abdomen is soft.      Tenderness: There is no abdominal tenderness.   Constitutional:       General: He is not in acute distress.     Appearance: He is well-developed and well-nourished.   Pulmonary:      Effort: Pulmonary effort is normal. No accessory muscle usage, respiratory distress or accessory muscle usage.      Breath sounds: Normal breath sounds.   Neurological:      General: No focal deficit present.      Mental Status: He is alert and oriented to person, place  and time. He is calm.      Motor: Strength full and intact in all extremities.            Diagnostic Studies      EKG: NSR  Imaging: CXR: No acute cardiopulmonary findings I have personally reviewed pertinent reports.       Medications:  Scheduled PRN   [START ON 6/16/2024] aspirin, 81 mg, Daily  atorvastatin, 80 mg, QPM  docusate sodium, 100 mg, BID  [START ON 6/16/2024] enoxaparin, 40 mg, Daily  metoprolol tartrate, 25 mg, Q6H  [START ON 6/16/2024] nicotine, 1 patch, Daily  [START ON 6/16/2024] ticagrelor, 90 mg, BID      acetaminophen, 650 mg, Q4H PRN  ondansetron, 4 mg, Q6H PRN       Continuous    sodium chloride, 100 mL/hr         Labs:    CBC    Recent Labs     06/15/24  1709   WBC 18.15*   HGB 17.8*   HCT 51.0*        BMP    Recent Labs     06/15/24  1709   SODIUM 133*   K 4.0   CL 97   CO2 26   AGAP 10   BUN 18   CREATININE 1.03   CALCIUM 10.4*       Coags    No recent results     Additional Electrolytes  No recent results       Blood Gas    No recent results  No recent results LFTs  Recent Labs     06/15/24  1709   ALT 21   AST 61*   ALKPHOS 84   ALB 5.2*   TBILI 1.47*       Infectious  No recent results  Glucose  Recent Labs     06/15/24  1709   GLUC 122             Anticipated Length of Stay is > 2 midnights  Emily Adorno PA-C

## 2024-06-16 NOTE — ASSESSMENT & PLAN NOTE
Cardiac cath performed in the setting of a late presenting MI. Anterior Q waves already developed but persistent CP & Elevated troponin.  100 % mid LAD occlusion. Successful PCI (radial approach) with p[acement of a 2.5 x 34 mm MARIA VICTORIA. The proximal part of the stent was posrt dilated to 2.75 mm.     Plan:  ASA 81 mg daily  Brilinta 90 mg BID  Atorvastatin. F/u lipid panel   Lopressor  ECHO  Secondary risk factor prevention strategies: smoking/alcohol cessation, lifestyle modification. Check A1C

## 2024-06-17 VITALS
RESPIRATION RATE: 24 BRPM | TEMPERATURE: 98.5 F | HEIGHT: 70 IN | OXYGEN SATURATION: 98 % | WEIGHT: 219 LBS | BODY MASS INDEX: 31.35 KG/M2 | DIASTOLIC BLOOD PRESSURE: 81 MMHG | SYSTOLIC BLOOD PRESSURE: 113 MMHG | HEART RATE: 77 BPM

## 2024-06-17 DIAGNOSIS — I24.9 ACS (ACUTE CORONARY SYNDROME) (HCC): Primary | ICD-10-CM

## 2024-06-17 PROBLEM — E87.1 HYPONATREMIA: Status: ACTIVE | Noted: 2024-06-17

## 2024-06-17 PROBLEM — I21.3 STEMI (ST ELEVATION MYOCARDIAL INFARCTION) (HCC): Status: ACTIVE | Noted: 2024-06-15

## 2024-06-17 PROBLEM — I50.22 HEART FAILURE WITH MID-RANGE EJECTION FRACTION (HFMEF) (HCC): Status: ACTIVE | Noted: 2024-06-17

## 2024-06-17 LAB
ALBUMIN SERPL BCP-MCNC: 3.8 G/DL (ref 3.5–5)
ALP SERPL-CCNC: 62 U/L (ref 34–104)
ALT SERPL W P-5'-P-CCNC: 36 U/L (ref 7–52)
ANION GAP SERPL CALCULATED.3IONS-SCNC: 7 MMOL/L (ref 4–13)
AST SERPL W P-5'-P-CCNC: 111 U/L (ref 13–39)
BILIRUB SERPL-MCNC: 1.08 MG/DL (ref 0.2–1)
BUN SERPL-MCNC: 11 MG/DL (ref 5–25)
CALCIUM SERPL-MCNC: 8.8 MG/DL (ref 8.4–10.2)
CHLORIDE SERPL-SCNC: 105 MMOL/L (ref 96–108)
CO2 SERPL-SCNC: 22 MMOL/L (ref 21–32)
CREAT SERPL-MCNC: 0.81 MG/DL (ref 0.6–1.3)
ERYTHROCYTE [DISTWIDTH] IN BLOOD BY AUTOMATED COUNT: 13.3 % (ref 11.6–15.1)
GFR SERPL CREATININE-BSD FRML MDRD: 105 ML/MIN/1.73SQ M
GLUCOSE SERPL-MCNC: 90 MG/DL (ref 65–140)
HCT VFR BLD AUTO: 42.1 % (ref 36.5–49.3)
HGB BLD-MCNC: 14.3 G/DL (ref 12–17)
MAGNESIUM SERPL-MCNC: 1.9 MG/DL (ref 1.9–2.7)
MCH RBC QN AUTO: 32 PG (ref 26.8–34.3)
MCHC RBC AUTO-ENTMCNC: 34 G/DL (ref 31.4–37.4)
MCV RBC AUTO: 94 FL (ref 82–98)
PLATELET # BLD AUTO: 218 THOUSANDS/UL (ref 149–390)
PMV BLD AUTO: 10.6 FL (ref 8.9–12.7)
POTASSIUM SERPL-SCNC: 3.4 MMOL/L (ref 3.5–5.3)
PROT SERPL-MCNC: 6.6 G/DL (ref 6.4–8.4)
RBC # BLD AUTO: 4.47 MILLION/UL (ref 3.88–5.62)
SODIUM SERPL-SCNC: 134 MMOL/L (ref 135–147)
WBC # BLD AUTO: 11.41 THOUSAND/UL (ref 4.31–10.16)

## 2024-06-17 PROCEDURE — 83735 ASSAY OF MAGNESIUM: CPT

## 2024-06-17 PROCEDURE — 85027 COMPLETE CBC AUTOMATED: CPT

## 2024-06-17 PROCEDURE — NC001 PR NO CHARGE: Performed by: INTERNAL MEDICINE

## 2024-06-17 PROCEDURE — 80053 COMPREHEN METABOLIC PANEL: CPT

## 2024-06-17 PROCEDURE — 99232 SBSQ HOSP IP/OBS MODERATE 35: CPT | Performed by: INTERNAL MEDICINE

## 2024-06-17 RX ORDER — SODIUM CHLORIDE, SODIUM GLUCONATE, SODIUM ACETATE, POTASSIUM CHLORIDE, MAGNESIUM CHLORIDE, SODIUM PHOSPHATE, DIBASIC, AND POTASSIUM PHOSPHATE .53; .5; .37; .037; .03; .012; .00082 G/100ML; G/100ML; G/100ML; G/100ML; G/100ML; G/100ML; G/100ML
500 INJECTION, SOLUTION INTRAVENOUS ONCE
Status: COMPLETED | OUTPATIENT
Start: 2024-06-17 | End: 2024-06-17

## 2024-06-17 RX ORDER — CLOPIDOGREL BISULFATE 75 MG/1
600 TABLET ORAL ONCE
Status: COMPLETED | OUTPATIENT
Start: 2024-06-17 | End: 2024-06-17

## 2024-06-17 RX ORDER — LISINOPRIL 10 MG/1
10 TABLET ORAL DAILY
Status: DISCONTINUED | OUTPATIENT
Start: 2024-06-17 | End: 2024-06-17

## 2024-06-17 RX ORDER — CLOPIDOGREL BISULFATE 75 MG/1
75 TABLET ORAL DAILY
Qty: 30 TABLET | Refills: 0 | Status: SHIPPED | OUTPATIENT
Start: 2024-06-18 | End: 2024-07-18

## 2024-06-17 RX ORDER — POTASSIUM CHLORIDE 20 MEQ/1
20 TABLET, EXTENDED RELEASE ORAL ONCE
Status: COMPLETED | OUTPATIENT
Start: 2024-06-17 | End: 2024-06-17

## 2024-06-17 RX ORDER — ATORVASTATIN CALCIUM 80 MG/1
80 TABLET, FILM COATED ORAL EVERY EVENING
Qty: 30 TABLET | Refills: 0 | Status: SHIPPED | OUTPATIENT
Start: 2024-06-17 | End: 2024-07-17

## 2024-06-17 RX ORDER — ASPIRIN 81 MG/1
81 TABLET, CHEWABLE ORAL DAILY
Qty: 30 TABLET | Refills: 0 | Status: SHIPPED | OUTPATIENT
Start: 2024-06-18 | End: 2024-07-18

## 2024-06-17 RX ORDER — LANOLIN ALCOHOL/MO/W.PET/CERES
400 CREAM (GRAM) TOPICAL ONCE
Status: COMPLETED | OUTPATIENT
Start: 2024-06-17 | End: 2024-06-17

## 2024-06-17 RX ORDER — CLOPIDOGREL BISULFATE 75 MG/1
75 TABLET ORAL DAILY
Status: DISCONTINUED | OUTPATIENT
Start: 2024-06-18 | End: 2024-06-17 | Stop reason: HOSPADM

## 2024-06-17 RX ADMIN — ENOXAPARIN SODIUM 40 MG: 40 INJECTION SUBCUTANEOUS at 08:15

## 2024-06-17 RX ADMIN — SODIUM CHLORIDE, SODIUM GLUCONATE, SODIUM ACETATE, POTASSIUM CHLORIDE, MAGNESIUM CHLORIDE, SODIUM PHOSPHATE, DIBASIC, AND POTASSIUM PHOSPHATE 500 ML: .53; .5; .37; .037; .03; .012; .00082 INJECTION, SOLUTION INTRAVENOUS at 02:25

## 2024-06-17 RX ADMIN — DOCUSATE SODIUM 100 MG: 100 CAPSULE, LIQUID FILLED ORAL at 08:15

## 2024-06-17 RX ADMIN — POTASSIUM CHLORIDE 20 MEQ: 1500 TABLET, EXTENDED RELEASE ORAL at 08:22

## 2024-06-17 RX ADMIN — MAGNESIUM OXIDE TAB 400 MG (241.3 MG ELEMENTAL MG) 400 MG: 400 (241.3 MG) TAB at 08:15

## 2024-06-17 RX ADMIN — CLOPIDOGREL BISULFATE 600 MG: 75 TABLET ORAL at 13:13

## 2024-06-17 RX ADMIN — METOPROLOL TARTRATE 25 MG: 25 TABLET, FILM COATED ORAL at 12:04

## 2024-06-17 RX ADMIN — ASPIRIN 81 MG CHEWABLE TABLET 81 MG: 81 TABLET CHEWABLE at 08:15

## 2024-06-17 RX ADMIN — TICAGRELOR 90 MG: 90 TABLET ORAL at 08:15

## 2024-06-17 RX ADMIN — Medication 1 PATCH: at 08:15

## 2024-06-17 NOTE — ASSESSMENT & PLAN NOTE
Recent Labs     06/15/24  1709 06/16/24  0503 06/17/24  0722   AST 61* 310* 111*   ALT 21 51 36   ALKPHOS 84 58 62   TBILI 1.47* 1.35* 1.08*   BILIDIR  --  0.23*  --      Bili 1.47. Denies abdominal pain.   AST trending down  Negative acute hepatitis panel  Possibly due to alcohol use. Reports social ETOH use.   RUQ US normal gallbladder, normal liver, and CBD measures 5.0 mm.   BMP in 1 week after discharge

## 2024-06-17 NOTE — ASSESSMENT & PLAN NOTE
Suspect reactive in setting on NSTEMI  CXR: (-) acute  Afebrile during hospital stay  Surveillance off antibiotics

## 2024-06-17 NOTE — PROGRESS NOTES
[facility]  Progress Note  Name: Moises Dunn I  MRN: [id number]  Unit/Bed#: ICU 14 I Date of Admission: 6/15/2024   Date of Service: 6/17/2024 I Hospital Day: 2    Assessment & Plan   * STEMI (ST elevation myocardial infarction) (HCC)  Assessment & Plan  Cardiac cath performed in the setting of a late presenting MI. Anterior Q waves already developed but persistent CP & Elevated troponin.  100 % mid LAD occlusion. Successful PCI (radial approach) with p[acement of a 2.5 x 34 mm MARIA VICTORIA. The proximal part of the stent was posrt dilated to 2.75 mm.  Echo: LVEF 45%, moderate asymmetric hypertrophy of the septal wall, hyopkinetic anterior, anteroseptal and apical walls.  Lipid panel is acceptable with total cholesterol 163, LDL 86 and HDL 52.   Hemoglobin A1C is 5.3  Received 500 cc IVF bolus due to soft bp with improvement     Plan:  ASA 81 mg daily  Brilinta 90 mg BID  Atorvastatin 80 mg  Metoprolol to 25 mg p.o BID  Resume Lisinopril 10 mg   Secondary risk factor prevention strategies: smoking/alcohol cessation, lifestyle modification.  Downgrade patient to med surg.    Leukocytosis  Assessment & Plan  Suspect reactive in setting on NSTEMI  CXR: (-) acute  Afebrile during hospital stay    Plan:  Surveillance off antibiotics  Trend WBC/temps    Tobacco abuse  Assessment & Plan  1/2 ppd.  Smoking cessation education. Patient requesting nicotine patch.     Elevated AST (SGOT)  Assessment & Plan  Recent Labs     06/15/24  1709 06/16/24  0503 06/17/24  0722   AST 61* 310* 111*   ALT 21 51 36   ALKPHOS 84 58 62   TBILI 1.47* 1.35* 1.08*   BILIDIR  --  0.23*  --        Bili 1.47. Denies abdominal pain.   AST trending down  Negative acute hepatitis panel  Possibly due to alcohol use. Reports social ETOH use.   RUQ US normal gallbladder, normal liver, and CBD measures 5.0 mm.   BMP in 1 week after discharge    Hyponatremia  Assessment & Plan  Recent Labs     06/15/24  1709 06/16/24  0503  06/17/24  0722   SODIUM 133* 133* 134*       S/p IV sodium chloride 100 cc/h for 10 hours on 6/16.  Sodium uptrending.  Monitor daily am bmp.             Disposition: Stepdown Level 1    ICU Core Measures     A: Assess, Prevent, and Manage Pain Has pain been assessed? Yes  Need for changes to pain regimen? No   B: Both SAT/SAT  N/A   C: Choice of Sedation RASS Goal: 0 Alert and Calm  Need for changes to sedation or analgesia regimen? NA   D: Delirium CAM-ICU: Negative   E: Early Mobility  Plan for early mobility? Yes   F: Family Engagement Plan for family engagement today? Yes         Prophylaxis:  VTE VTE covered by:  enoxaparin, Subcutaneous, 40 mg at 06/17/24 0815       Stress Ulcer  not ordered         Significant 24hr Events     24hr events: Patient underwent cardiac cath, s/p with MARIA VICTORIA for 100% mid LAD occlusion. No further episode of chest pain. Blood pressure have remained soft, and responded appropriately to 500 cc bolus of Isolyte.  Denies chest pain, difficulty breathing, abdominal pain, dysuria. Appetite improved.     Subjective   Review of Systems: Review of Systems   Constitutional:  Negative for chills and fever.   HENT:  Negative for ear pain and sore throat.    Eyes:  Negative for pain and visual disturbance.   Respiratory:  Negative for cough and shortness of breath.    Cardiovascular:  Negative for chest pain and palpitations.   Gastrointestinal:  Negative for abdominal pain and vomiting.   Genitourinary:  Negative for dysuria and hematuria.   Musculoskeletal:  Negative for arthralgias and back pain.   Skin:  Negative for color change and rash.   Neurological:  Negative for seizures and syncope.   All other systems reviewed and are negative.       Objective                            Vitals I/O      Most Recent Min/Max in 24hrs   Temp 98 °F (36.7 °C) Temp  Min: 98 °F (36.7 °C)  Max: 98.6 °F (37 °C)   Pulse 78 Pulse  Min: 66  Max: 88   Resp 19 Resp  Min: 10  Max: 42   /84 BP  Min: 72/45  Max:  119/84   O2 Sat 98 % SpO2  Min: 95 %  Max: 99 %      Intake/Output Summary (Last 24 hours) at 6/17/2024 1027  Last data filed at 6/17/2024 1001  Gross per 24 hour   Intake 1640 ml   Output 2150 ml   Net -510 ml       Diet Cardiovascular; Cardiac    Invasive Monitoring           Physical Exam   Physical Exam  Vitals reviewed.   Eyes:      Pupils: Pupils are equal, round, and reactive to light.   Skin:     General: Skin is warm and dry.   HENT:      Head: Normocephalic and atraumatic.      Nose: No congestion or rhinorrhea.      Mouth/Throat:      Mouth: Mucous membranes are moist.   Cardiovascular:      Rate and Rhythm: Normal rate and regular rhythm.      Pulses: Normal pulses.   Musculoskeletal:         General: Normal range of motion.   Abdominal:      Palpations: Abdomen is soft.   Constitutional:       General: He is not in acute distress.     Appearance: He is well-developed. He is not toxic-appearing.   Pulmonary:      Effort: Pulmonary effort is normal.   Neurological:      General: No focal deficit present.      Mental Status: He is alert.      Cranial Nerves: No dysarthria or facial asymmetry.            Diagnostic Studies      EKG: NSR, HR 69  Imaging: Ultrasound RUQ is normal. I have personally reviewed pertinent reports.       Medications:  Scheduled PRN   aspirin, 81 mg, Daily  atorvastatin, 80 mg, QPM  docusate sodium, 100 mg, BID  enoxaparin, 40 mg, Daily  metoprolol tartrate, 25 mg, BID  nicotine, 1 patch, Daily  ticagrelor, 90 mg, BID      acetaminophen, 650 mg, Q4H PRN  ondansetron, 4 mg, Q6H PRN       Continuous          Labs:    CBC    Recent Labs     06/16/24  0503 06/17/24  0722   WBC 16.08* 11.41*   HGB 15.0 14.3   HCT 43.2 42.1    218     BMP    Recent Labs     06/16/24  0503 06/17/24  0722   SODIUM 133* 134*   K 3.5 3.4*    105   CO2 25 22   AGAP 7 7   BUN 15 11   CREATININE 0.78 0.81   CALCIUM 9.1 8.8       Coags    Recent Labs     06/16/24  0138   INR 1.03        Additional  Electrolytes  Recent Labs     06/16/24  0503 06/17/24  0722   MG 1.8* 1.9   PHOS 3.7  --           Blood Gas    No recent results  No recent results LFTs  Recent Labs     06/16/24  0503 06/17/24  0722   ALT 51 36   * 111*   ALKPHOS 58 62   ALB 3.9 3.8   TBILI 1.35* 1.08*       Infectious  No recent results  Glucose  Recent Labs     06/15/24  1709 06/16/24  0503 06/17/24  0722   GLUC 122 113 90               Glen Avendaño MD

## 2024-06-17 NOTE — ASSESSMENT & PLAN NOTE
Recent Labs     06/15/24  1709 06/16/24  0503 06/17/24  0722   SODIUM 133* 133* 134*       S/p IV sodium chloride 100 cc/h for 10 hours on 6/16.  Sodium uptrending.  BMP in 1 week

## 2024-06-17 NOTE — ASSESSMENT & PLAN NOTE
Cardiac cath performed in the setting of a late presenting MI. Anterior Q waves already developed but persistent CP & Elevated troponin.  100 % mid LAD occlusion. Successful PCI (radial approach) with p[acement of a 2.5 x 34 mm MARIA VICTORIA. The proximal part of the stent was posrt dilated to 2.75 mm.  Echo: LVEF 45%, moderate asymmetric hypertrophy of the septal wall, hyopkinetic anterior, anteroseptal and apical walls.  Lipid panel is acceptable with total cholesterol 163, LDL 86 and HDL 52.   Hemoglobin A1C is 5.3  Received 500 cc IVF bolus due to soft bp with improvement     Plan:  ASA 81 mg daily  Brilinta 90 mg BID  Atorvastatin 80 mg  Metoprolol to 25 mg p.o BID  Resume Lisinopril 10 mg   Secondary risk factor prevention strategies: smoking/alcohol cessation, lifestyle modification.  Downgrade patient to med surg.

## 2024-06-17 NOTE — ASSESSMENT & PLAN NOTE
- pt was smoking 1/2 ppd prior to STEMI  - currently wearing nicotine patches   - discussed importance of smoking cessation

## 2024-06-17 NOTE — CASE MANAGEMENT
Case Management Assessment & Discharge Planning Note    Patient name Moises Dunn  Location ICU 14/ICU 14 MRN [id number]  : 1975 Date 2024       Current Admission Date: 6/15/2024  Current Admission Diagnosis:STEMI (ST elevation myocardial infarction) (HCC)   Patient Active Problem List    Diagnosis Date Noted Date Diagnosed    Hyponatremia 2024     Heart failure with mid-range ejection fraction (HFmEF) ([facility]) 2024     STEMI (ST elevation myocardial infarction) ([facility]) 06/15/2024     Tobacco abuse 06/15/2024     Leukocytosis 06/15/2024     Elevated AST (SGOT) 06/15/2024       LOS (days): 2  Geometric Mean LOS (GMLOS) (days):   Days to GMLOS:     OBJECTIVE:    Risk of Unplanned Readmission Score: 7.29       Current admission status: Inpatient     Preferred Pharmacy:   [employer] #[id number] [facility] [address] [facility] [address]  [address] [place] [id number]  Phone: [phone] Fax: [phone]    [facility] [address]  [facility],  [address]  [facility],  First Floor [facility] [address]  Phone: [phone] Fax: [phone]    Primary Care Provider: Shree Melchor    Primary Insurance: SHARON HAMM PENDING  Secondary Insurance:     ASSESSMENT:  Active Health Care Proxies       Carmen Lopez Care Representative - Mother   Primary Phone: [phone] (Mobile)                 Advance Directives  Does patient have a Health Care POA?: No  Was patient offered paperwork?: Yes (provided)  Does patient currently have a Health Care decision maker?: Yes, please see Health Care Proxy section  Does patient have Advance Directives?: No  Was patient offered paperwork?: Yes (provided)  Primary Contact: Carmen Lopez (mother) [phone]     Readmission Root Cause  30 Day Readmission: No    Patient Information  Admitted from:: Home  Mental Status: Alert  During Assessment patient was accompanied by: Not accompanied during assessment  Assessment information  provided by:: Patient  Primary Caregiver: Self  Support Systems: Spouse/significant other, Children  County of Residence: [place]  What city do you live in?: [place]  Home entry access options. Select all that apply.: Stairs  Number of steps to enter home.: One Flight  Do the steps have railings?: Yes  Type of Current Residence: 2 story home  Floor Level:  (basement)  Upon entering residence, is there a bedroom on the main floor (no further steps)?: No  A bedroom is located on the following floor levels of residence (select all that apply):: Basement  Upon entering residence, is there a bathroom on the main floor (no further steps)?: Yes  Number of steps to basement from main floor: One Flight  Living Arrangements: Lives Alone  Is patient a ?: No    Activities of Daily Living Prior to Admission  Functional Status: Independent  Completes ADLs independently?: Yes  Ambulates independently?: Yes  Does patient use assisted devices?: No  Does patient currently own DME?: No  Does patient have a history of Outpatient Therapy (PT/OT)?: No  Does the patient have a history of Short-Term Rehab?: No  Does patient have a history of HHC?: No  Does patient currently have HHC?: No     Patient Information Continued  Income Source: Employed  Does patient have prescription coverage?: No  Does patient receive dialysis treatments?: No  Does patient have a history of substance abuse?: No  Does patient have a history of Mental Health Diagnosis?: No    PHQ 2/9 Screening   Reviewed PHQ 2/9 Depression Screening Score?: No    Means of Transportation  Means of Transport to Appts:: Drives Self    Social Determinants of Health (SDOH)      Flowsheet Row Most Recent Value   Housing Stability    In the last 12 months, was there a time when you were not able to pay the mortgage or rent on time? N   In the past 12 months, how many times have you moved where you were living? 0   At any time in the past 12 months, were you homeless or living in  a shelter (including now)? N   Transportation Needs    In the past 12 months, has lack of transportation kept you from medical appointments or from getting medications? no   In the past 12 months, has lack of transportation kept you from meetings, work, or from getting things needed for daily living? No   Food Insecurity    Within the past 12 months, you worried that your food would run out before you got the money to buy more. Never true   Within the past 12 months, the food you bought just didn't last and you didn't have money to get more. Never true   Utilities    In the past 12 months has the electric, gas, oil, or water company threatened to shut off services in your home? No          DISCHARGE DETAILS:    Discharge planning discussed with:: patient  Freedom of Choice: Yes     CM contacted family/caregiver?: No- see comments  Were Treatment Team discharge recommendations reviewed with patient/caregiver?: Yes  Did patient/caregiver verbalize understanding of patient care needs?: Yes  Were patient/caregiver advised of the risks associated with not following Treatment Team discharge recommendations?: Yes    Contacts  Patient Contacts: Carmen Lopez (mother) [phone]  Relationship to Patient:: Family  Contact Method: Phone  Phone Number: [phone]  Reason/Outcome: Emergency Contact, Discharge Planning    Requested Home Health Care         Is the patient interested in HHC at discharge?: No    DME Referral Provided  Referral made for DME?: No    Other Referral/Resources/Interventions Provided:  Financial Resources Provided: Financial Counselor  Interventions: Advanced Directives    Would you like to participate in our [facility] service program?  : Yes    Treatment Team Recommendation: Home  Discharge Destination Plan:: Home  Transport at Discharge : Automobile (family)     Transfer Mode: Ambulate  Accompanied by: Family member     Additional Comments: CM met with the patient at the bedside for CM intake  and discharge planning. Patient has no medical insurance and is unsure whether he can afford the Brilinta he will be discharge don CM did a ferguson check with [facility] and updated Dr. Barragan on the cost ($452.90) the patient will be switched to Plavix which is more affordable. CM sent the patient's information to the Financial  for assistance. CM will provide an update to the patient and will continue following the patient through hospital discharge.

## 2024-06-17 NOTE — PROGRESS NOTES
Critical Care Interval Transfer Note:        Brief Hospital Summary:   Hospital Course: 48 y.o. male with PMHx of HTN/remote methamphetamine/tobacco use use was referred from urgent care for eval in ED for chest pain that began around noon today (6/15). In ED, he was found to have an abnormal EKG and upon case review with cardiology, confirmed STEMI. He was taken to the cardiac cath lab where he was found to have 100% mid LAD occlusion. A successful PCI was performed (radial approach) w/ placement of a MARIA VICTORIA to the LAD. He was transferred to the the ICU hemodynamically stable for 24 hour monitoring. Blood pressures were soft overnight, responding to 500 cc of fluid.  He is started on metoprolol and cardiology recommend starting ACEi or ARB when blood pressure allows. Patient had prior history of taking lisinopril 10 mg and has tolerated well in the past. Transferring patient to [facility] with telemetry.      Barriers to discharge:   Started on metoprolol 25 mg BID today  Continue monitoring blood pressures.  Start on lisinopril 10 mg tomorrow if MAPs above 65 and SBP above 110 maintained.     Consults: IP CONSULT TO CARDIOLOGY    Recommended to review admission imaging for incidental findings and document in discharge navigator: Chart reviewed, no known incidental findings noted at this time.      Discharge Plan: Anticipate discharge tomorrow to home.            Patient seen and evaluated by Critical Care today and deemed to be appropriate for transfer to [facility] with Telemetry. Spoke to Dr. Sharan Bowden from [facility] to accept transfer. Critical care can be contacted via [employer] with any questions or concerns.

## 2024-06-17 NOTE — DISCHARGE INSTR - AVS FIRST PAGE
Dear Moises Dunn,     It was our pleasure to care for you here at [facility].  It is our hope that we were always able to exceed the expected standards for your care during your stay.  You were hospitalized due to ST elevation myocardial infarction.  You were cared for on the second floor by Glen Avendaño MD under the service of Sharan Bowden,  with the [facility] who covers for your primary care physician (PCP), Shree Melchor, while you were hospitalized.  If you have any questions or concerns related to this hospitalization, you may contact us at .  For follow up as well as any medication refills, we recommend that you follow up with your primary care physician.  A registered nurse will reach out to you by phone within a few days after your discharge to answer any additional questions that you may have after going home.  However, at this time we provide for you here, the most important instructions / recommendations at discharge:     Notable Medication Adjustments -   Please start taking aspirin 81 mg daily.  Please start taking atorvastatin 80 mg by mouth every evening.  Please start taking clopidogrel 75 mg daily from tomorrow.  Please start taking metoprolol tartrate 25 mg by mouth twice daily starting tonight.  Testing Required after Discharge -   CMP in 1 week  ** Please contact your PCP to request testing orders for any of the testing recommended here **  Important follow up information -   Please follow-up with your primary care physician in 1 week.  A referral has been made to cardiology- please ensure you see them as well.  Other Instructions -   If you have any difficulty breathing, chest pain, shortness of breath, dizziness, lightheadedness, nausea, vomiting, excessive bleeding, please call your doctor or visit the emergency department  Please review this entire after visit summary as additional general instructions  including medication list, appointments, activity, diet, any pertinent wound care, and other additional recommendations from your care team that may be provided for you.      Sincerely,     Glen Avendaño MD

## 2024-06-17 NOTE — ASSESSMENT & PLAN NOTE
- STEMI 06/15/24: s/p PCI/MARIA VICTORIA with placement of 2.5 x 34 mm to the mid LAD   - DAPT: Plavix 75 mg daily and ASA 81 mg daily (priced checked brilinta which was ~$650 a month). Will load with Plavix 600 mg of today.  - beta blocker: Lopressor 25 mg BID  - statin therapy: atorvastatin 80 mg daily    Discussed importance of cardiac rehabilitation. Referral in discharge tab. I will messaged our office staff to set up a follow up appointment for him in one week Continue DAPT, beta blocker, and statin therapy. Comfortable for discharge later today.

## 2024-06-17 NOTE — ASSESSMENT & PLAN NOTE
Wt Readings from Last 3 Encounters:   06/16/24 99.3 kg (219 lb)   01/20/24 88.5 kg (195 lb)     Echo 06/16/24: LVEF 45% anterior and apical hypokinesis   Continue GDMT  Ambulatory referral to cardiology for follow up.  Cardiac rehab

## 2024-06-17 NOTE — PROGRESS NOTES
[facility]  Progress Note  Name: Moises Dunn I  MRN: [id number]  Unit/Bed#: ICU 14 I Date of Admission: 6/15/2024   Date of Service: 6/17/2024 I Hospital Day: 2    Assessment & Plan   * STEMI (ST elevation myocardial infarction) ([facility])  Assessment & Plan  - STEMI 06/15/24: s/p PCI/MARIA VICTORIA with placement of 2.5 x 34 mm to the mid LAD   - DAPT: Plavix 75 mg daily and ASA 81 mg daily (priced checked brilinta which was ~$650 a month). Will load with Plavix 600 mg of today.  - beta blocker: Lopressor 25 mg BID  - statin therapy: atorvastatin 80 mg daily    Discussed importance of cardiac rehabilitation. Referral in discharge tab. I will messaged our office staff to set up a follow up appointment for him in one week Continue DAPT, beta blocker, and statin therapy.      Heart failure with mid-range ejection fraction (HFmEF) ([facility])  Assessment & Plan  - etiology: ischemic cardiomyopathy   - echo 06/16/24: LVEF 45% anterior and apical hypokinesis   - Neurohormonal Blockade:   -- beta blocker: Lopressor 25 mg BID  -- ACE/ARB/ARNi: lisinopril 10 mg daily d/c 06/17/24 due to hypotension.  -- aldosterone antagonist: none  -- diuretic: none    Pt appears euvolemic on exam. Continue Lopressor 25 mg BID. Can consider adding ACE/ARB back on in the outpatient setting if blood pressure allows.      Tobacco abuse  Assessment & Plan  - pt was smoking 1/2 ppd prior to STEMI  - currently wearing nicotine patches   - discussed importance of smoking cessation         Subjective:  Pt seen and examined. No acute events overnight. States he feels good and is ready to go home. Denies chest pain, SOB, lightheadedness, dizziness, palpitations, and flutters in the chest.    Vitals:  Vitals:    06/16/24 0600 06/16/24 0832   Weight: 99.6 kg (219 lb 9.3 oz) 99.3 kg (219 lb)   ,  Vitals:    06/17/24 1000 06/17/24 1154 06/17/24 1200 06/17/24 1204   BP: 119/84  109/67 113/81   Pulse: 78  84 77   Resp: 19  19 (!) 24   Temp:   98.5 °F (36.9 °C)     TempSrc:  Probe     SpO2: 98%      Weight:       Height:           Exam:  Physical Exam  Vitals and nursing note reviewed.   Constitutional:       General: He is not in acute distress.     Appearance: Normal appearance. He is not ill-appearing.   HENT:      Head: Normocephalic and atraumatic.      Nose: Nose normal.      Mouth/Throat:      Mouth: Mucous membranes are moist.   Eyes:      General: No scleral icterus.  Neck:      Vascular: No JVD.   Cardiovascular:      Rate and Rhythm: Normal rate and regular rhythm.      Pulses:           Radial pulses are 2+ on the right side and 2+ on the left side.      Heart sounds: S1 normal and S2 normal. No murmur heard.  Pulmonary:      Effort: Pulmonary effort is normal. No respiratory distress.      Breath sounds: Normal breath sounds. No stridor. No wheezing, rhonchi or rales.   Abdominal:      General: Abdomen is flat. There is no distension.   Musculoskeletal:         General: No swelling. Normal range of motion.      Cervical back: Normal range of motion.      Right lower leg: No edema.      Left lower leg: No edema.   Skin:     General: Skin is warm and dry.      Capillary Refill: Capillary refill takes less than 2 seconds.   Neurological:      Mental Status: He is alert and oriented to person, place, and time.   Psychiatric:         Thought Content: Thought content normal.        Telemetry:       Normal sinus rhythm, no ectopy , Heart Rate ~75 bpm    Medications:    Current Facility-Administered Medications:     acetaminophen (TYLENOL) tablet 650 mg, 650 mg, Oral, Q4H PRN, Gino Esquivel MD    aspirin chewable tablet 81 mg, 81 mg, Oral, Daily, Gino Esquivel MD, 81 mg at 06/17/24 0815    atorvastatin (LIPITOR) tablet 80 mg, 80 mg, Oral, QPM, Gion Esquivel MD, 80 mg at 06/16/24 1709    clopidogrel (PLAVIX) tablet 600 mg, 600 mg, Oral, Once, Kimberly Carrasco PA-C    [START ON 6/18/2024] clopidogrel (PLAVIX) tablet 75 mg, 75 mg,  Oral, Daily, Kimberly Carrasco PA-C    docusate sodium (COLACE) capsule 100 mg, 100 mg, Oral, BID, Emily Adorno PA-C, 100 mg at 06/17/24 0815    enoxaparin (LOVENOX) subcutaneous injection 40 mg, 40 mg, Subcutaneous, Daily, Emily Adorno PA-C, 40 mg at 06/17/24 0815    metoprolol tartrate (LOPRESSOR) tablet 25 mg, 25 mg, Oral, BID, Glen Avendaño MD, 25 mg at 06/17/24 1204    nicotine (NICODERM CQ) 14 mg/24hr TD 24 hr patch 1 patch, 1 patch, Transdermal, Daily, Emily Adorno PA-C, 1 patch at 06/17/24 0815    ondansetron (ZOFRAN) injection 4 mg, 4 mg, Intravenous, Q6H PRN, Emily Adorno PA-C    Labs/Data:        Results from last 7 days   Lab Units 06/17/24  0722 06/16/24  0503 06/15/24  1709   WBC Thousand/uL 11.41* 16.08* 18.15*   HEMOGLOBIN g/dL 14.3 15.0 17.8*   HEMATOCRIT % 42.1 43.2 51.0*   PLATELETS Thousands/uL 218 233 315     Results from last 7 days   Lab Units 06/17/24  0722 06/16/24  0503 06/15/24  1709   POTASSIUM mmol/L 3.4* 3.5 4.0   CHLORIDE mmol/L 105 101 97   CO2 mmol/L 22 25 26   BUN mg/dL 11 15 18   CREATININE mg/dL 0.81 0.78 1.03       Kimberly Carrasco PA-C

## 2024-06-17 NOTE — NURSING NOTE
AVS printed and reviewed with patient. All questions answered at this time. Patient walked to pharmacy to  medications.

## 2024-06-17 NOTE — DISCHARGE SUMMARY
[facility]  Discharge- Moises Dunn 1975, 48 y.o. male MRN: [id number]  Unit/Bed#: ICU 14 Encounter: [id number]  Primary Care Provider: Srhee Melchor   Date and time admitted to hospital: 6/15/2024  4:51 PM    Heart failure with mid-range ejection fraction (HFmEF) ([facility])  Assessment & Plan  Wt Readings from Last 3 Encounters:   06/16/24 99.3 kg (219 lb)   01/20/24 88.5 kg (195 lb)     Echo 06/16/24: LVEF 45% anterior and apical hypokinesis   Continue GDMT  Ambulatory referral to cardiology for follow up.  Cardiac rehab        * STEMI (ST elevation myocardial infarction) ([facility])  Assessment & Plan  Cardiac cath performed in the setting of a late presenting MI. Anterior Q waves already developed but persistent CP & Elevated troponin.  100 % mid LAD occlusion. Successful PCI (radial approach) with p[acement of a 2.5 x 34 mm MARIA VICTORIA. The proximal part of the stent was posrt dilated to 2.75 mm.  Echo: LVEF 45%, moderate asymmetric hypertrophy of the septal wall, hyopkinetic anterior, anteroseptal and apical walls.  Lipid panel is acceptable with total cholesterol 163, LDL 86 and HDL 52.   Hemoglobin A1C is 5.3  Received 500 cc IVF bolus due to soft bp with improvement     Plan:  ASA 81 mg daily  Plavix 75 mg daily   Atorvastatin 80 mg  Metoprolol to 25 mg p.o BID  Resume Lisinopril 10 mg in the outpatient setting  Secondary risk factor prevention strategies: smoking/alcohol cessation, lifestyle modification.  Patient is stable for discharge.  Follow-up with cardiology and initiate cardiac rehab.    Leukocytosis  Assessment & Plan  Suspect reactive in setting on NSTEMI  CXR: (-) acute  Afebrile during hospital stay  Surveillance off antibiotics    Tobacco abuse  Assessment & Plan  1/2 ppd.  Smoking cessation education. Patient requesting nicotine patch.     Elevated AST (SGOT)  Assessment & Plan  Recent Labs     06/15/24  1709 06/16/24  0503 06/17/24  0722   AST 61* 310* 111*   ALT 21 51 36    ALKPHOS 84 58 62   TBILI 1.47* 1.35* 1.08*   BILIDIR  --  0.23*  --        Bili 1.47. Denies abdominal pain.   AST trending down  Negative acute hepatitis panel  Possibly due to alcohol use. Reports social ETOH use.   RUQ US normal gallbladder, normal liver, and CBD measures 5.0 mm.   CMP in 1 week after discharge    Hyponatremia  Assessment & Plan  Recent Labs     06/15/24  1709 06/16/24  0503 06/17/24  0722   SODIUM 133* 133* 134*       S/p IV sodium chloride 100 cc/h for 10 hours on 6/16.  Sodium uptrending.  BMP in 1 week      Medical Problems       Resolved Problems  Date Reviewed: 6/15/2024   None       Discharging Resident: Glen Avendaño MD  Discharging Attending: No att. providers found  PCP: Shree Melchor  Admission Date:   Admission Orders (From admission, onward)       Ordered        06/15/24 2142  Inpatient Admission  Once                          Discharge Date: 06/17/24    Consultations During Hospital Stay:  Cardiology    Procedures Performed:   Cardiac catheterization    Significant Findings / Test Results:   US right upper quadrant    Result Date: 6/16/2024  Impression: Normal. Workstation performed: [id number]     XR chest 1 view portable    Result Date: 6/15/2024  Impression: No acute cardiopulmonary disease. Workstation performed: [id number]       XR chest 1 view portable    Result Date: 6/15/2024  Impression No acute cardiopulmonary disease. Workstation performed: [id number]          Incidental Findings:   100% mid LAD occlusion  I reviewed the above mentioned incidental findings with the patient and/or family and they expressed understanding.    Test Results Pending at Discharge (will require follow up):   None     Outpatient Tests Requested:  Recommend CMP in 1 week    Complications: None    Reason for Admission: ST elevation MI    Hospital Course:   Moises Dunn is a 48 y.o. male patient who originally presented to the hospital on 6/15/2024 due to chest pain.  In the ED he was found to  have abnormal EKG and upon Case reviewed with cardiology, patient was deemed STEMI.  Taken to cardiac Cath Lab where he was found to have 100% mid LAD occlusion.  Successful PCI was performed via radial approach with placement of MARIA VICTORIA to LAD.  Status post catheterization he was transferred to the ICU.  Overnight patient's blood pressures were soft and responded well to 500 cc IV fluid bolus.  After cardiac cath, patient was started on aspirin, Brilinta and metoprolol.  Upon price check Brilinta was removed and patient was started on Plavix.  Echocardiogram of the heart shows LV ejection fraction 45% and anterior and apical hypokinesis, diagnosed with heart failure with midrange ejection fraction.  He will continue take GDMT and resume lisinopril 10 mg in the outpatient setting.  Lisinopril was discontinued during the hospitalization due to hypotension.      The patient, initially admitted to the hospital as inpatient, was discharged earlier than expected given the following: Hemodynamically stable status postcardiac catheterization.    Please see above list of diagnoses and related plan for additional information.     Condition at Discharge: stable    Discharge Day Visit / Exam:   * Please refer to separate progress note for these details *    Discussion with Family: Patient declined call to .     Discharge instructions/Information to patient and family:   See after visit summary for information provided to patient and family.      Provisions for Follow-Up Care:  See after visit summary for information related to follow-up care and any pertinent home health orders.      Mobility at time of Discharge:   Basic Mobility Inpatient Raw Score: 24  JH-HLM Goal: 8: Walk 250 feet or more  JH-HLM Achieved: 8: Walk 250 feet ot more  HLM Goal achieved. Continue to encourage appropriate mobility.     Disposition:   Home    Planned Readmission: None    Discharge Medications:  See after visit summary for reconciled  discharge medications provided to patient and/or family.      **Please Note: This note may have been constructed using a voice recognition system**

## 2024-06-17 NOTE — ASSESSMENT & PLAN NOTE
Suspect reactive in setting on NSTEMI  CXR: (-) acute  Afebrile during hospital stay    Plan:  Surveillance off antibiotics  Trend WBC/temps

## 2024-06-17 NOTE — ASSESSMENT & PLAN NOTE
- etiology: ischemic cardiomyopathy   - echo 06/16/24: LVEF 45% anterior and apical hypokinesis   - Neurohormonal Blockade:   -- beta blocker: Lopressor 25 mg BID  -- ACE/ARB/ARNi: lisinopril 10 mg daily d/c 06/17/24 due to hypotension.  -- aldosterone antagonist: none  -- diuretic: none    Pt appears euvolemic on exam. Continue Lopressor 25 mg BID. Can consider adding ACE/ARB back on in the outpatient setting if blood pressure allows.

## 2024-06-17 NOTE — UTILIZATION REVIEW
Initial Clinical Review    Admission: Date/Time/Statement:   Admission Orders (From admission, onward)       Ordered        06/15/24 2142  Inpatient Admission  Once                          Orders Placed This Encounter   Procedures    Inpatient Admission     Standing Status:   Standing     Number of Occurrences:   1     Order Specific Question:   Level of Care     Answer:   Level 1 Stepdown [13]     Order Specific Question:   Estimated length of stay     Answer:   More than 2 Midnights     Order Specific Question:   Certification     Answer:   I certify that inpatient services are medically necessary for this patient for a duration of greater than two midnights. See H&P and MD Progress Notes for additional information about the patient's course of treatment.     ED Arrival Information       Expected   6/15/2024 16:44    Arrival   6/15/2024 16:50    Acuity   Emergent              Means of arrival   Walk-In    Escorted by   Family Member    Service   Hospitalist    Admission type   Emergency              Arrival complaint   Chest pain, unspecified type             Chief Complaint   Patient presents with    Chest Pain     Pt reports CP began at 1pm. In center of chest that shoots to shoulders. States some SOB. Hx hypertension.        Initial Presentation: 48 y.o. male presents to the ED from Urgent Care with c/o chest pain starting at noon today.  PMH: HTN, /remote methamphetamine/tobacco use.  In the ED he is HTN.  He was taken to the Cardiac cath lab and found to have 100% mild LAD occlusion, successful PCI with Placement MARIA VICTORIA to LAD. Labs - troponin elevation, leukocytosis, elevated calcium, T bili, low NA.  Imaging - no acute disease.  ECG - acute MI/STEMI.  Treated with NGT SL x 1, NTG Paste 1 inch, IV Pepcid, PO and IV Lopressor, Heparin bolus and drip, NTG Drip, Plavix.  On exam he has central line in place, R radial TR band.  Admitted to INPATIENT status to Level 1 SD with STEMI. Leukocytosis, tobacco use,  elevated AST - s/p cardiac cath with PCI, MARIA VICTORIA to LAD, trend WBC, temps, smoking cessation, US RUQ, OP GI eval.      Anticipated Length of Stay/Certification Statement: Anticipated Length of Stay is > 2 midnights     6/15 Cardio Note:         Cardiac cath performed in the setting of a late presenting MI. Anterior Q waves already developed but persistent CP.  Elevated troponin.     100 % mid LAD occlusion. Successful PCI with p[acement of a 2.5 x 34 mm MARIA VICTORIA. The proximal part of the stent was posrt dilated to 2.75 mm.     ASA 81 mg daily  Brilinta 90 mg BID   Lopressor  ECHO           ++++++++++++++++++++  6/15 Cardiac Cath -   STEMI    Left Anterior DescendingThe vessel was visualized by angiography and is large.Mid LAD lesion is 100% stenosed. Culprit lesion. Culprit for clinical presentation.AUBREY flow is 0. The lesion is type C, located at the major branch and thrombotic.    Drug-eluting stent was successfully placed. The stent used was a STENT BUBBA FRONTIER 2.5MM X 34MM.    The intervention was successful. The guidewire crossed the lesion. Post-intervention AUBREY flow is 3. Lesion had 34 mm of its length treated. There were no complications.There is a 0% residual stenosis post intervention.    Left CircumflexThe vessel was visualized by angiography and is large. There is moderate diffuse disease throughout the vessel.    Right Coronary ArteryThe vessel was visualized by angiography, is large and dominant. There is moderate diffuse disease throughout the vessel.  Recommendation:  Recommendation 1. ASA 81 mg daily  2. Brilinta 90 mg BID  3. Beta blocjer therapy  4. ECHO  5 Cardiac rehab   +++++++++++++++++++    6/16 Echo -   Left Ventricle: Left ventricular cavity size is normal. Wall thickness is moderately increased. There is moderate asymmetric hypertrophy of the septal wall. The left ventricular ejection fraction is 45%. Systolic function is mildly reduced. Diastolic function is normal.    The following segments  are hypokinetic: mid anterior, mid anteroseptal, apical anterior, apical septal, apical inferior, apical lateral and apex.    All other segments are normal.    6/16 Cardio Consult -   Late presentation of anterior wall STEMI, status post PCI/MARIA VICTORIA (2.5 x 34 mm) to 100% mid LAD occlusion  Ongoing tobacco use  Hypertension  Dyslipidemia   Patient feeling well now, chest pain-free.  No ongoing symptoms.  Continue dual antiplatelet therapy, high-dose atorvastatin  Will reduce metoprolol to 25 mg p.o. twice daily considering marginally low blood pressure  Add ACE inhibitor or ARB tomorrow as blood pressure allows  Echocardiogram pending.  Preliminary review at bedside reveals mildly reduced left ventricular systolic function  Okay to transfer to telemetry floor later today     ED Triage Vitals   Temperature Pulse Respirations Blood Pressure SpO2 Pain Score   06/15/24 1715 06/15/24 1656 06/15/24 1656 06/15/24 1700 06/15/24 1656 06/15/24 1911   98.3 °F (36.8 °C) 95 22 (!) 183/127 97 % 5     Weight (last 2 days)       Date/Time Weight    06/16/24 0832 99.3 (219)    06/16/24 0600 99.6 (219.58)    06/15/24 2143 99.6 (219.58)    06/15/24 1656 100 (220.46)            Vital Signs (last 3 days)       Date/Time Temp Pulse Resp BP MAP (mmHg) SpO2 Calculated FIO2 (%) - Nasal Cannula Nasal Cannula O2 Flow Rate (L/min) O2 Device Patient Position - Orthostatic VS [place] Coma Scale Score Pain    06/17/24 1154 98.5 °F (36.9 °C) -- -- -- -- -- -- -- -- -- -- --    06/17/24 1000 -- 78 19 119/84 102 98 % -- -- -- -- -- --    06/17/24 0900 -- 88 42 113/75 93 97 % -- -- -- -- -- --    06/17/24 0815 -- -- -- -- -- -- -- -- -- -- 15 --    06/17/24 0800 -- 74 -- 106/77 89 97 % -- -- None (Room air) -- -- No Pain    06/17/24 0700 98 °F (36.7 °C) 68 10 101/56 73 97 % -- -- -- -- -- --    06/17/24 0600 -- 68 19 94/69 77 96 % -- -- -- -- -- --    06/17/24 0510 -- -- -- -- -- -- -- -- -- -- -- No Pain    06/17/24 0500 -- 74 13 102/66 76 97 % -- -- --  -- -- --    06/17/24 0400 -- 66 19 106/70 83 97 % -- -- -- -- -- --    06/17/24 0340 -- 84 31 97/63 74 98 % -- -- -- -- -- --    06/17/24 0300 -- 74 11 86/51 67 98 % -- -- -- -- -- --    06/17/24 0248 98.6 °F (37 °C) -- -- -- -- -- -- -- -- -- -- --    06/17/24 0205 -- -- -- 77/54 -- -- -- -- -- -- -- --    06/17/24 0200 -- 78 19 72/45 57 95 % -- -- -- -- -- --    06/17/24 0100 -- 76 17 83/49 57 96 % -- -- -- -- -- No Pain    06/17/24 0000 -- 74 15 93/60 69 97 % -- -- -- -- -- --    06/16/24 2300 -- 74 21 93/64 75 96 % -- -- -- -- -- --    06/16/24 2239 98.6 °F (37 °C) -- -- -- -- -- -- -- -- -- -- --    06/16/24 2200 -- 70 -- 99/67 81 99 % -- -- -- -- -- --    06/16/24 2100 -- 72 13 109/72 87 98 % -- -- -- -- -- No Pain    06/16/24 1926 98.4 °F (36.9 °C) -- -- -- -- -- -- -- -- -- -- --    06/16/24 1503 -- -- -- -- -- -- -- -- -- -- 15 --    06/16/24 1109 98.6 °F (37 °C) 68 15 109/71 88 97 % -- -- -- -- -- --    06/16/24 1100 -- 72 22 109/71 88 97 % -- -- -- -- -- --    06/16/24 0832 -- 73 -- 114/71 -- -- -- -- -- -- -- --    06/16/24 0825 -- 73 -- 114/71 -- -- -- -- -- -- -- --    06/16/24 0744 -- -- -- -- -- -- -- -- None (Room air) -- 15 No Pain    06/16/24 0729 98.2 °F (36.8 °C) -- -- -- -- -- -- -- -- -- -- --    06/16/24 0500 -- 72 19 101/66 78 98 % -- -- -- -- -- --    06/16/24 0400 -- 70 18 110/71 89 -- -- -- -- -- -- --    06/16/24 0300 -- 68 10 96/65 78 98 % -- -- -- -- -- --    06/16/24 0200 -- 72 17 106/68 84 97 % -- -- -- -- -- --    06/16/24 0100 -- 70 15 121/78 92 97 % -- -- -- -- -- --    06/16/24 0000 -- 68 11 116/74 94 98 % -- -- -- -- 15 No Pain    06/15/24 2300 -- 72 23 119/74 94 97 % -- -- -- -- -- --    06/15/24 2200 -- 70 19 140/86 111 99 % -- -- -- -- -- --    06/15/24 2145 -- -- -- -- -- -- -- -- -- -- 15 3    06/15/24 2120 98.2 °F (36.8 °C) 78 13 134/87 104 99 % -- -- None (Room air) -- -- --    06/15/24 21:03:57 -- -- -- -- -- -- -- -- -- -- -- No Pain    06/15/24 20:17:46 -- -- -- -- --  -- -- -- -- -- -- No Pain    06/15/24 20:17:26 -- -- -- -- -- 99 % 28 2 L/min Nasal cannula -- -- --    06/15/24 2000 -- 69 18 142/84 107 98 % -- -- None (Room air) -- -- 1    06/15/24 1955 -- 73 16 141/88 -- 98 % -- -- None (Room air) -- -- 2    06/15/24 1950 -- 71 16 145/85 -- 98 % -- -- None (Room air) -- -- 2    06/15/24 1946 -- 71 16 140/93 -- 98 % -- -- None (Room air) -- -- 2    06/15/24 1940 -- 67 16 138/98 -- 99 % -- -- None (Room air) -- -- 2    06/15/24 1935 -- 73 16 151/91 -- 98 % -- -- None (Room air) -- -- 2    06/15/24 1930 -- 73 16 149/95 116 98 % -- -- None (Room air) -- -- 3    06/15/24 1925 -- 71 16 149/93 -- 98 % -- -- None (Room air) -- -- 3    06/15/24 1915 -- 85 16 159/91 118 100 % -- -- None (Room air) -- -- 3    06/15/24 1913 -- 73 -- 169/100 -- -- -- -- -- -- -- --    06/15/24 1911 -- -- -- -- -- -- -- -- -- -- -- 5    06/15/24 1903 -- -- -- 177/98 -- 99 % -- -- None (Room air) Lying -- --    06/15/24 1851 -- -- -- 145/87 -- -- -- -- -- Lying -- --    06/15/24 1849 -- -- -- 159/94 -- -- -- -- -- -- -- --    06/15/24 1830 -- 88 17 168/107 132 99 % -- -- None (Room air) Lying -- --    06/15/24 1815 -- 84 17 158/99 123 100 % -- -- None (Room air) Lying -- --    06/15/24 1746 -- -- -- -- -- -- -- -- -- -- 15 --    06/15/24 1732 -- -- -- -- -- -- -- -- None (Room air) -- -- --    06/15/24 1730 -- 90 14 -- -- 100 % -- -- None (Room air) -- -- --    06/15/24 1715 98.3 °F (36.8 °C) 111 19 161/103 128 99 % -- -- None (Room air) Lying -- --    06/15/24 1700 -- 101 22 183/127 150 100 % -- -- None (Room air) Lying -- --    06/15/24 1656 -- 95 22 -- -- 97 % -- -- -- -- -- --              Pertinent Labs/Diagnostic Test Results:   Radiology:  US right upper quadrant   Final Interpretation by Hakeem Thibodeaux MD (06/16 1445)      Normal.      Workstation performed: [id number]         XR chest 1 view portable   Final Interpretation by Jocelynn Gallardo MD (06/15 1944)      No acute cardiopulmonary  disease.            Workstation performed: [id number]           Cardiology:  Echo complete w/ contrast if indicated   Final Result by Ivette Navarro DO (06/16 9583)        Left Ventricle: Left ventricular cavity size is normal. Wall thickness    is moderately increased. There is moderate asymmetric hypertrophy of the    septal wall. The left ventricular ejection fraction is 45%. Systolic    function is mildly reduced. Diastolic function is normal.     The following segments are hypokinetic: mid anterior, mid anteroseptal,    apical anterior, apical septal, apical inferior, apical lateral and apex.     All other segments are normal.         ECG 12 lead   Final Result by Ivette Navarro DO (06/16 1916)   Normal sinus rhythm   Anteroseptal infarct (cited on or before 15-VALORIE-2024)   ** ** ACUTE MI / STEMI ** **   When compared with ECG of 15-VALORIE-2024 20:00, (unconfirmed)   Serial changes of Anteroseptal infarct Present   Confirmed by Ivette Navarro ([id number]) on 6/16/2024 3:51:52 PM      Cardiac catheterization   Final Result by Gino Esquivel MD (06/17 1037)        Left Anterior DescendingThe vessel was visualized by angiography and is    large.Mid LAD lesion is 100% stenosed. Culprit lesion. Culprit for    clinical presentation.AUBREY flow is 0. The lesion is type C, located at the    major branch and thrombotic.     Drug-eluting stent was successfully placed. The stent used was a STENT    BUBBA FRONTIER 2.5MM X 34MM.     The intervention was successful. The guidewire crossed the lesion.    Post-intervention AUBREY flow is 3. Lesion had 34 mm of its length treated.    There were no complications.There is a 0% residual stenosis post    intervention.     Left CircumflexThe vessel was visualized by angiography and is large.    There is moderate diffuse disease throughout the vessel.     Right Coronary ArteryThe vessel was visualized by angiography, is large    and dominant. There is moderate diffuse disease throughout the vessel.          ECG 12 lead   Final Result by Ivette Navarro DO (06/16 1551)   Normal sinus rhythm   Anteroseptal infarct (cited on or before 15-VALORIE-2024)   ** ** ACUTE MI / STEMI ** **   When compared with ECG of 15-VALORIE-2024 19:31, (unconfirmed)   No significant change was found   Confirmed by Ivette Navarro ([id number]) on 6/16/2024 3:51:46 PM      ECG 12 lead   Final Result by Ivette Navarro DO (06/16 1551)   Normal sinus rhythm   Anteroseptal infarct (cited on or before 15-VALORIE-2024)   ** ** ACUTE MI / STEMI ** **   When compared with ECG of 15-VALORIE-2024 19:04, (unconfirmed)   Serial changes of Anteroseptal infarct Present   Confirmed by Ivette Navarro ([id number]) on 6/16/2024 3:51:41 PM      ECG 12 lead   Final Result by Ivette Navarro DO (06/16 1551)   Normal sinus rhythm   Anteroseptal infarct (cited on or before 15-VALORIE-2024)   ** ** ACUTE MI / STEMI ** **   When compared with ECG of 15-VALORIE-2024 18:31, (unconfirmed)   No significant change was found   Confirmed by Ivette Navarro ([id number]) on 6/16/2024 3:51:13 PM      ECG 12 lead   Final Result by Ivette Navarro DO (06/16 1551)   Normal sinus rhythm   Anteroseptal infarct (cited on or before 15-VALORIE-2024)   ** ** ACUTE MI / STEMI ** **   When compared with ECG of 15-VALORIE-2024 18:31, (unconfirmed)   No significant change was found   Confirmed by Ivette Navarro ([id number]) on 6/16/2024 3:51:02 PM      ECG 12 lead   Final Result by Ivette Navarro DO (06/16 1550)   Normal sinus rhythm   Anteroseptal infarct (cited on or before 15-VALORIE-2024)   ** ** ACUTE MI / STEMI ** **   When compared with ECG of 15-VALORIE-2024 17:56, (unconfirmed)   No significant change was found   Confirmed by Ivette Navarro ([id number]) on 6/16/2024 3:50:51 PM      ECG 12 lead   Final Result by Ivette Navarro DO (06/16 8495)   Normal sinus rhythm   Anteroseptal infarct (cited on or before 15-VALORIE-2024)   ** ** ACUTE MI / STEMI ** **   Abnormal ECG   When compared with ECG of 15-VALORIE-2024 16:55,   No significant change was found   Confirmed by Ramon,  Ivette ([id number]) on 6/16/2024 3:50:33 PM      ECG 12 lead   Final Result by Ivette Navarro DO (06/16 1550)   Normal sinus rhythm   Anteroseptal infarct , age undetermined   Abnormal ECG   No previous ECGs available   Confirmed by Ivette Navarro ([id number]) on 6/16/2024 3:50:19 PM        GI:  No orders to display           Results from last 7 days   Lab Units 06/17/24 0722 06/16/24  0503 06/15/24  1709   WBC Thousand/uL 11.41* 16.08* 18.15*   HEMOGLOBIN g/dL 14.3 15.0 17.8*   HEMATOCRIT % 42.1 43.2 51.0*   PLATELETS Thousands/uL 218 233 315   TOTAL NEUT ABS Thousands/µL  --  10.64* 14.64*         Results from last 7 days   Lab Units 06/17/24 0722 06/16/24  0503 06/15/24  1709   SODIUM mmol/L 134* 133* 133*   POTASSIUM mmol/L 3.4* 3.5 4.0   CHLORIDE mmol/L 105 101 97   CO2 mmol/L 22 25 26   ANION GAP mmol/L 7 7 10   BUN mg/dL 11 15 18   CREATININE mg/dL 0.81 0.78 1.03   EGFR ml/min/1.73sq m 105 106 85   CALCIUM mg/dL 8.8 9.1 10.4*   MAGNESIUM mg/dL 1.9 1.8*  --    PHOSPHORUS mg/dL  --  3.7  --      Results from last 7 days   Lab Units 06/17/24  0722 06/16/24  0503 06/15/24  1709   AST U/L 111* 310* 61*   ALT U/L 36 51 21   ALK PHOS U/L 62 58 84   TOTAL PROTEIN g/dL 6.6 6.3* 8.8*   ALBUMIN g/dL 3.8 3.9 5.2*   TOTAL BILIRUBIN mg/dL 1.08* 1.35* 1.47*   BILIRUBIN DIRECT mg/dL  --  0.23*  --          Results from last 7 days   Lab Units 06/17/24 0722 06/16/24  0503 06/15/24  1709   GLUCOSE RANDOM mg/dL 90 113 122         Results from last 7 days   Lab Units 06/15/24  2155   HEMOGLOBIN A1C % 5.3   EAG mg/dl 105     Results from last 7 days   Lab Units 06/15/24  2155 06/15/24  1905 06/15/24  1709   HS TNI 0HR ng/L  --   --  2,154*   HS TNI 2HR ng/L  --  6,809*  --    HSTNI D2 ng/L  --  4,655*  --    HS TNI 4HR ng/L >22,973*  --   --    HSTNI D4 ng/L >20,819*  --   --          Results from last 7 days   Lab Units 06/16/24  0138   PROTIME seconds 13.7   INR  1.03     Results from last 7 days   Lab Units 06/16/24  0503   HEP B S AG   Non-reactive   HEP C AB  Non-reactive   HEP B C IGM  Non-reactive     ED Treatment-Medication Administration from 06/15/2024 1644 to 06/15/2024 2011         Date/Time Order Dose Route Action     06/15/2024 1706 nitroglycerin (NITROSTAT) SL tablet 0.4 mg 0.4 mg Sublingual Given     06/15/2024 1707 Famotidine (PF) (PEPCID) injection 20 mg 20 mg Intravenous Given     06/15/2024 1802 metoprolol tartrate (LOPRESSOR) tablet 25 mg 25 mg Oral Given     06/15/2024 1802 atorvastatin (LIPITOR) tablet 80 mg 80 mg Oral Given     06/15/2024 1807 nitroglycerin (NITRO-BID) 2 % TD ointment 1 inch 1 inch Topical Given     06/15/2024 1811 heparin (porcine) injection 4,000 Units 4,000 Units Intravenous Given     06/15/2024 1820 heparin (porcine) 25,000 units in 0.45% NaCl 250 mL infusion (premix) 11.1 Units/kg/hr Intravenous New Bag     06/15/2024 1838 nitroGLYcerin (TRIDIL) 50 mg in 250 mL infusion (premix) 5 mcg/min Intravenous New Bag     06/15/2024 1849 nitroGLYcerin (TRIDIL) 50 mg in 250 mL infusion (premix) 10 mcg/min Intravenous Rate/Dose Change     06/15/2024 1908 nitroGLYcerin (TRIDIL) 50 mg in 250 mL infusion (premix) 15 mcg/min Intravenous Rate/Dose Change     06/15/2024 1913 nitroGLYcerin (TRIDIL) 50 mg in 250 mL infusion (premix) 20 mcg/min Intravenous Rate/Dose Change     06/15/2024 1930 nitroGLYcerin (TRIDIL) 50 mg in 250 mL infusion (premix) 30 mcg/min Intravenous Rate/Dose Change     06/15/2024 1939 nitroGLYcerin (TRIDIL) 50 mg in 250 mL infusion (premix) 40 mcg/min Intravenous Rate/Dose Change     06/15/2024 1947 nitroGLYcerin (TRIDIL) 50 mg in 250 mL infusion (premix) 50 mcg/min Intravenous Rate/Dose Change     06/15/2024 1953 nitroGLYcerin (TRIDIL) 50 mg in 250 mL infusion (premix) 60 mcg/min Intravenous Rate/Dose Change     06/15/2024 1853 clopidogrel (PLAVIX) tablet 600 mg 600 mg Oral Given     06/15/2024 1845 metoprolol (LOPRESSOR) injection 5 mg 5 mg Intravenous Given     06/15/2024 1858 metoprolol (LOPRESSOR)  injection 5 mg 5 mg Intravenous Given     06/15/2024 1911 morphine injection 4 mg 4 mg Intravenous Given     06/15/2024 1922 metoprolol (LOPRESSOR) injection 5 mg 5 mg Intravenous Given       Admitting Diagnosis: Chest pain [R07.9]  Elevated troponin [R79.89]  NSTEMI (non-ST elevated myocardial infarction) (HCC) [I21.4]  Hypertensive emergency [I16.1]  Age/Sex: 48 y.o. male  Admission Orders:  Scheduled Medications:  aspirin, 81 mg, Oral, Daily  atorvastatin, 80 mg, Oral, QPM  docusate sodium, 100 mg, Oral, BID  enoxaparin, 40 mg, Subcutaneous, Daily  metoprolol tartrate, 25 mg, Oral, BID  nicotine, 1 patch, Transdermal, Daily  ticagrelor, 90 mg, Oral, BID      Continuous IV Infusions:     PRN Meds:  acetaminophen, 650 mg, Oral, Q4H PRN  ondansetron, 4 mg, Intravenous, Q6H PRN        IP CONSULT TO CARDIOLOGY    Network Utilization Review Department  ATTENTION: Please call with any questions or concerns to [phone] and carefully listen to the prompts so that you are directed to the right person. All voicemails are confidential.   For Discharge needs, contact Care Management DC Support Team at [phone] opt. [phone]  Send all requests for admission clinical reviews, approved or denied determinations and any other requests to dedicated fax number below belonging to the campus where the patient is receiving treatment. List of dedicated fax numbers for the Facilities:  FACILITY NAME UR FAX NUMBER   ADMISSION DENIALS (Administrative/Medical Necessity) [phone]   DISCHARGE SUPPORT TEAM (NETWORK) [phone]   PARENT CHILD HEALTH (Maternity/NICU/Pediatrics) [phone]   [facility] [phone]   [facility] [phone]   [facility] [phone]   [facility] [phone]   [facility] [phone]   [facility] [phone]   [facility]  [phone]   [facility] [phone]   [facility] [phone]   [facility] [phone]   [facility] [phone]   [facility] [phone]

## 2024-06-17 NOTE — ASSESSMENT & PLAN NOTE
Cardiac cath performed in the setting of a late presenting MI. Anterior Q waves already developed but persistent CP & Elevated troponin.  100 % mid LAD occlusion. Successful PCI (radial approach) with p[acement of a 2.5 x 34 mm MARIA VICTORIA. The proximal part of the stent was posrt dilated to 2.75 mm.  Echo: LVEF 45%, moderate asymmetric hypertrophy of the septal wall, hyopkinetic anterior, anteroseptal and apical walls.  Lipid panel is acceptable with total cholesterol 163, LDL 86 and HDL 52.   Hemoglobin A1C is 5.3  Received 500 cc IVF bolus due to soft bp with improvement     Plan:  ASA 81 mg daily  Plavix 75 mg daily   Atorvastatin 80 mg  Metoprolol to 25 mg p.o BID  Resume Lisinopril 10 mg in the outpatient setting  Secondary risk factor prevention strategies: smoking/alcohol cessation, lifestyle modification.  Patient is stable for discharge.  Follow-up with cardiology and initiate cardiac rehab.

## 2024-06-17 NOTE — ASSESSMENT & PLAN NOTE
Recent Labs     06/15/24  1709 06/16/24  0503 06/17/24  0722   AST 61* 310* 111*   ALT 21 51 36   ALKPHOS 84 58 62   TBILI 1.47* 1.35* 1.08*   BILIDIR  --  0.23*  --        Bili 1.47. Denies abdominal pain.   AST trending down  Negative acute hepatitis panel  Possibly due to alcohol use. Reports social ETOH use.   RUQ US normal gallbladder, normal liver, and CBD measures 5.0 mm.   CMP in 1 week after discharge

## 2024-06-17 NOTE — ASSESSMENT & PLAN NOTE
Recent Labs     06/15/24  1709 06/16/24  0503 06/17/24  0722   SODIUM 133* 133* 134*     S/p IV sodium chloride 100 cc/h for 10 hours on 6/16.  Sodium uptrending.  Monitor daily am bmp.

## 2024-06-17 NOTE — PLAN OF CARE
Problem: Potential for Falls  Goal: Patient will remain free of falls  Description: INTERVENTIONS:  - Educate patient/family on patient safety including physical limitations  - Instruct patient to call for assistance with activity   - Consult OT/PT to assist with strengthening/mobility   - Keep Call bell within reach  - Keep bed low and locked with side rails adjusted as appropriate  - Keep care items and personal belongings within reach  - Initiate and maintain comfort rounds  - Make Fall Risk Sign visible to staff  - Offer Toileting every 2 Hours, in advance of need  - Initiate/Maintain bed alarm  - Obtain necessary fall risk management equipment: call bell, urinal, side rails, and socks.  - Apply yellow socks and bracelet for high fall risk patients  - Consider moving patient to room near nurses station  Outcome: Progressing     Problem: DISCHARGE PLANNING  Goal: Discharge to home or other facility with appropriate resources  Description: INTERVENTIONS:  - Identify barriers to discharge w/patient and caregiver  - Arrange for needed discharge resources and transportation as appropriate  - Identify discharge learning needs (meds, wound care, etc.)  - Arrange for interpretive services to assist at discharge as needed  - Refer to Case Management Department for coordinating discharge planning if the patient needs post-hospital services based on physician/advanced practitioner order or complex needs related to functional status, cognitive ability, or social support system  Outcome: Progressing     Problem: Knowledge Deficit  Goal: Patient/family/caregiver demonstrates understanding of disease process, treatment plan, medications, and discharge instructions  Description: Complete learning assessment and assess knowledge base.  Interventions:  - Provide teaching at level of understanding  - Provide teaching via preferred learning methods  Outcome: Progressing     Problem: CARDIOVASCULAR - ADULT  Goal: Maintains optimal  cardiac output and hemodynamic stability  Description: INTERVENTIONS:  - Monitor I/O, vital signs and rhythm  - Monitor for S/S and trends of decreased cardiac output  - Administer and titrate ordered vasoactive medications to optimize hemodynamic stability  - Assess quality of pulses, skin color and temperature  - Assess for signs of decreased coronary artery perfusion  - Instruct patient to report change in severity of symptoms  Outcome: Progressing  Goal: Absence of cardiac dysrhythmias or at baseline rhythm  Description: INTERVENTIONS:  - Continuous cardiac monitoring, vital signs, obtain 12 lead EKG if ordered  - Administer antiarrhythmic and heart rate control medications as ordered  - Monitor electrolytes and administer replacement therapy as ordered  Outcome: Progressing

## 2024-06-17 NOTE — PLAN OF CARE
Problem: Potential for Falls  Goal: Patient will remain free of falls  Description: INTERVENTIONS:  - Educate patient/family on patient safety including physical limitations  - Instruct patient to call for assistance with activity   - Consult OT/PT to assist with strengthening/mobility   - Keep Call bell within reach  - Keep bed low and locked with side rails adjusted as appropriate  - Keep care items and personal belongings within reach  - Initiate and maintain comfort rounds  - Make Fall Risk Sign visible to staff  - Offer Toileting every 2 Hours, in advance of need  - Initiate/Maintain bed alarm  - Obtain necessary fall risk management equipment: yellow socks  - Apply yellow socks and bracelet for high fall risk patients  - Consider moving patient to room near nurses station  Outcome: Progressing     Problem: DISCHARGE PLANNING  Goal: Discharge to home or other facility with appropriate resources  Description: INTERVENTIONS:  - Identify barriers to discharge w/patient and caregiver  - Arrange for needed discharge resources and transportation as appropriate  - Identify discharge learning needs (meds, wound care, etc.)  - Arrange for interpretive services to assist at discharge as needed  - Refer to Case Management Department for coordinating discharge planning if the patient needs post-hospital services based on physician/advanced practitioner order or complex needs related to functional status, cognitive ability, or social support system  Outcome: Progressing     Problem: Knowledge Deficit  Goal: Patient/family/caregiver demonstrates understanding of disease process, treatment plan, medications, and discharge instructions  Description: Complete learning assessment and assess knowledge base.  Interventions:  - Provide teaching at level of understanding  - Provide teaching via preferred learning methods  Outcome: Progressing     Problem: CARDIOVASCULAR - ADULT  Goal: Maintains optimal cardiac output and  hemodynamic stability  Description: INTERVENTIONS:  - Monitor I/O, vital signs and rhythm  - Monitor for S/S and trends of decreased cardiac output  - Administer and titrate ordered vasoactive medications to optimize hemodynamic stability  - Assess quality of pulses, skin color and temperature  - Assess for signs of decreased coronary artery perfusion  - Instruct patient to report change in severity of symptoms  Outcome: Progressing  Goal: Absence of cardiac dysrhythmias or at baseline rhythm  Description: INTERVENTIONS:  - Continuous cardiac monitoring, vital signs, obtain 12 lead EKG if ordered  - Administer antiarrhythmic and heart rate control medications as ordered  - Monitor electrolytes and administer replacement therapy as ordered  Outcome: Progressing

## 2024-06-18 ENCOUNTER — TELEPHONE (OUTPATIENT)
Dept: CARDIOLOGY CLINIC | Facility: CLINIC | Age: 49
End: 2024-06-18

## 2024-07-15 ENCOUNTER — OCCMED (OUTPATIENT)
Dept: URGENT CARE | Facility: CLINIC | Age: 49
End: 2024-07-15

## 2024-07-15 DIAGNOSIS — Z02.1 PHYSICAL EXAM, PRE-EMPLOYMENT: Primary | ICD-10-CM

## 2024-07-16 ENCOUNTER — TELEPHONE (OUTPATIENT)
Age: 49
End: 2024-07-16

## 2024-07-16 ENCOUNTER — OFFICE VISIT (OUTPATIENT)
Dept: CARDIOLOGY CLINIC | Facility: CLINIC | Age: 49
End: 2024-07-16

## 2024-07-16 VITALS
SYSTOLIC BLOOD PRESSURE: 110 MMHG | DIASTOLIC BLOOD PRESSURE: 72 MMHG | WEIGHT: 216 LBS | HEART RATE: 60 BPM | BODY MASS INDEX: 30.92 KG/M2 | HEIGHT: 70 IN

## 2024-07-16 DIAGNOSIS — I21.02 ST ELEVATION MYOCARDIAL INFARCTION INVOLVING LEFT ANTERIOR DESCENDING (LAD) CORONARY ARTERY (HCC): ICD-10-CM

## 2024-07-16 DIAGNOSIS — I25.10 CORONARY ARTERY DISEASE INVOLVING NATIVE CORONARY ARTERY OF NATIVE HEART WITHOUT ANGINA PECTORIS: Primary | ICD-10-CM

## 2024-07-16 DIAGNOSIS — I16.1 HYPERTENSIVE EMERGENCY: ICD-10-CM

## 2024-07-16 DIAGNOSIS — E78.5 DYSLIPIDEMIA: ICD-10-CM

## 2024-07-16 PROCEDURE — 99214 OFFICE O/P EST MOD 30 MIN: CPT | Performed by: INTERNAL MEDICINE

## 2024-07-16 PROCEDURE — 93000 ELECTROCARDIOGRAM COMPLETE: CPT | Performed by: INTERNAL MEDICINE

## 2024-07-16 RX ORDER — ATORVASTATIN CALCIUM 80 MG/1
80 TABLET, FILM COATED ORAL EVERY EVENING
Qty: 30 TABLET | Refills: 0 | Status: SHIPPED | OUTPATIENT
Start: 2024-07-16 | End: 2024-08-15

## 2024-07-16 RX ORDER — ASPIRIN 81 MG/1
81 TABLET, CHEWABLE ORAL DAILY
Qty: 30 TABLET | Refills: 0 | Status: SHIPPED | OUTPATIENT
Start: 2024-07-16 | End: 2024-08-15

## 2024-07-16 RX ORDER — LISINOPRIL 5 MG/1
5 TABLET ORAL DAILY
Qty: 90 TABLET | Refills: 3 | Status: SHIPPED | OUTPATIENT
Start: 2024-07-16

## 2024-07-16 RX ORDER — CLOPIDOGREL BISULFATE 75 MG/1
75 TABLET ORAL DAILY
Qty: 30 TABLET | Refills: 0 | Status: SHIPPED | OUTPATIENT
Start: 2024-07-16 | End: 2024-08-15

## 2024-07-16 NOTE — PROGRESS NOTES
"      Cardiology             Moises Dunn  1975  [id number]              Assessment/Plan:    CAD with late presentation anterior wall STEMI 6/15/2024 status post PCI/MARIA VICTORIA to 100% mid LAD occlusion (2.5 x 34 mm)  Prior tobacco use  Hypertension  Dyslipidemia      No symptoms of angina, continue aspirin, clopidogrel  Continue high-dose atorvastatin  Continue metoprolol.  Blood pressure and heart rate well-controlled  Will start lisinopril 5 mg daily in light of recent STEMI and mild ischemic cardiomyopathy with ejection fraction 45%.  Potential adverse drug reactions reviewed with patient and he will call if he experiences any.  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  In the ER, he was given nitroglycerin infusion with metoprolol, without improvement of symptoms.  An MI alert was called and cardiac catheterization revealed 100% mid LAD occlusion.  He underwent PCI/MARIA VICTORIA with a 2.5 x 34 mm stent with good results.  He had no residual obstructive disease.  There was moderate diffuse disease throughout the left circumflex and RCA.  He was discharged on aspirin and clopidogrel.    He presents today for follow-up.  He feels excellent without chest pain, shortness of breath, dizziness, palpitations, lower extreme edema.  He has been compliant with his medications.  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              Vitals:  Vitals:    07/16/24 1034   BP: 110/72   Pulse: 60   Weight: 98 kg (216 lb)   Height: 5' 10\" (1.778 m)         No past medical " history on file.  Social History     Socioeconomic History   • Marital status:      Spouse name: Not on file   • Number of children: Not on file   • Years of education: Not on file   • Highest education level: Not on file   Occupational History   • Not on file   Tobacco Use   • Smoking status: Some Days     Types: Cigarettes, Cigars   • Smokeless tobacco: Never   Substance and Sexual Activity   • Alcohol use: Yes   • Drug use: Never   • Sexual activity: Not on file   Other Topics Concern   • Not on file   Social History Narrative   • Not on file     Social Determinants of Health     Financial Resource Strain: Not on file   Food Insecurity: No Food Insecurity (6/17/2024)    Hunger Vital Sign    • Worried About Running Out of Food in the Last Year: Never true    • Ran Out of Food in the Last Year: Never true   Transportation Needs: No Transportation Needs (6/17/2024)    PRAPARE - Transportation    • Lack of Transportation (Medical): No    • Lack of Transportation (Non-Medical): No   Physical Activity: Not on file   Stress: Not on file   Social Connections: Not on file   Intimate Partner Violence: Not on file   Housing Stability: Low Risk  (6/17/2024)    Housing Stability Vital Sign    • Unable to Pay for Housing in the Last Year: No    • Number of Times Moved in the Last Year: 0    • Homeless in the Last Year: No      No family history on file.  Past Surgical History:   Procedure Laterality Date   • CARDIAC CATHETERIZATION Left 6/15/2024    Procedure: Cardiac pci;  Surgeon: Gino Esquivel MD;  Location: [facility];  Service: Cardiology   • CARDIAC CATHETERIZATION  6/15/2024    Procedure: Cardiac catheterization;  Surgeon: Gino Esquivel MD;  Location: [facility];  Service: Cardiology   • CARDIAC CATHETERIZATION N/A 6/15/2024    Procedure: Cardiac Coronary Angiogram;  Surgeon: Gino Esquivel MD;  Location: [facility];  Service: Cardiology       Current Outpatient Medications:    •  aspirin 81 mg chewable tablet, Chew 1 tablet (81 mg total) daily, Disp: 30 tablet, Rfl: 0  •  atorvastatin (LIPITOR) 80 mg tablet, Take 1 tablet (80 mg total) by mouth every evening, Disp: 30 tablet, Rfl: 0  •  clopidogrel (PLAVIX) 75 mg tablet, Take 1 tablet (75 mg total) by mouth daily, Disp: 30 tablet, Rfl: 0  •  fexofenadine (ALLEGRA) 60 MG tablet, Take 30 mg by mouth daily, Disp: , Rfl:   •  fluticasone (FLONASE) 50 mcg/act nasal spray, 2 sprays into each nostril continuous as needed for rhinitis, Disp: , Rfl:   •  lisinopril (ZESTRIL) 5 mg tablet, Take 1 tablet (5 mg total) by mouth daily, Disp: 90 tablet, Rfl: 3  •  metoprolol tartrate (LOPRESSOR) 25 mg tablet, Take 1 tablet (25 mg total) by mouth 2 (two) times a day, Disp: 60 tablet, Rfl: 0        Review of Systems:  Review of Systems   Respiratory: Negative.     Cardiovascular: Negative.    All other systems reviewed and are negative.        Physical Exam:  Physical Exam  Constitutional:       General: He is not in acute distress.     Appearance: He is well-developed. He is not diaphoretic.   HENT:      Head: Normocephalic and atraumatic.   Eyes:      General: No scleral icterus.        Right eye: No discharge.      Extraocular Movements: EOM normal.      Pupils: Pupils are equal, round, and reactive to light.   Neck:      Thyroid: No thyromegaly.   Cardiovascular:      Rate and Rhythm: Normal rate and regular rhythm.      Heart sounds: Normal heart sounds. No murmur heard.     No friction rub. No gallop.   Pulmonary:      Effort: Pulmonary effort is normal.      Breath sounds: Normal breath sounds.   Abdominal:      General: There is no distension.      Tenderness: There is no abdominal tenderness. There is no guarding or rebound.   Musculoskeletal:         General: No edema. Normal range of motion.      Cervical back: Normal range of motion and neck supple.   Skin:     General: Skin is warm and dry.      Coloration: Skin is not pale.      Findings:  No erythema or rash.   Neurological:      Mental Status: He is alert and oriented to person, place, and time.      Coordination: Coordination normal.   Psychiatric:         Mood and Affect: Mood and affect normal.         Behavior: Behavior normal.         Thought Content: Thought content normal.         Judgment: Judgment normal.         This note was completed in part utilizing M-Modal Fluency Direct Software.  Grammatical errors, random word insertions, spelling mistakes, and incomplete sentences can be an occasional consequence of this system secondary to software limitations, ambient noise, and hardware issues.  If you have any questions or concerns about the content, text, or information contained within the body of this dictation, please contact the provider for clarification.

## 2024-07-16 NOTE — TELEPHONE ENCOUNTER
Patient will like to drop off some clearance paperwork he needs to be filled out to return back to work. Patient stated he will stop by today.

## 2024-08-21 DIAGNOSIS — I21.02 ST ELEVATION MYOCARDIAL INFARCTION INVOLVING LEFT ANTERIOR DESCENDING (LAD) CORONARY ARTERY (HCC): ICD-10-CM

## 2024-08-21 DIAGNOSIS — I25.10 CORONARY ARTERY DISEASE INVOLVING NATIVE CORONARY ARTERY OF NATIVE HEART WITHOUT ANGINA PECTORIS: ICD-10-CM

## 2024-08-21 DIAGNOSIS — I16.1 HYPERTENSIVE EMERGENCY: ICD-10-CM

## 2024-08-21 RX ORDER — ATORVASTATIN CALCIUM 80 MG/1
80 TABLET, FILM COATED ORAL EVERY EVENING
Qty: 90 TABLET | Refills: 1 | Status: SHIPPED | OUTPATIENT
Start: 2024-08-21 | End: 2025-02-17

## 2024-08-21 RX ORDER — CLOPIDOGREL BISULFATE 75 MG/1
75 TABLET ORAL DAILY
Qty: 90 TABLET | Refills: 1 | Status: SHIPPED | OUTPATIENT
Start: 2024-08-21 | End: 2025-02-17

## 2024-08-21 RX ORDER — METOPROLOL TARTRATE 25 MG/1
25 TABLET, FILM COATED ORAL 2 TIMES DAILY
Qty: 180 TABLET | Refills: 1 | Status: SHIPPED | OUTPATIENT
Start: 2024-08-21 | End: 2025-02-17

## 2024-08-21 RX ORDER — ASPIRIN 81 MG/1
81 TABLET, CHEWABLE ORAL DAILY
Qty: 90 TABLET | Refills: 1 | Status: SHIPPED | OUTPATIENT
Start: 2024-08-21 | End: 2025-02-17

## 2024-08-21 RX ORDER — LISINOPRIL 5 MG/1
5 TABLET ORAL DAILY
Qty: 90 TABLET | Refills: 1 | Status: SHIPPED | OUTPATIENT
Start: 2024-08-21

## 2024-08-21 NOTE — TELEPHONE ENCOUNTER
Reason for call:   [x] Refill   [] Prior Auth  [] Other:     Office:   [] PCP/Provider -   [x] Specialty/Provider - Andrea Navarro DO    Medication: metoprolol tartrate (LOPRESSOR) 25 mg tablet     Dose/Frequency: Take 1 tablet (25 mg total) by mouth 2 (two) times a day    Quantity: 60 tablet    Medication: lisinopril (ZESTRIL) 5 mg tablet    Dose/Frequency: Take 1 tablet (5 mg total) by mouth daily    Quantity: 90 tablet      Medication: clopidogrel (PLAVIX) 75 mg tablet     Dose/Frequency: Take 1 tablet (75 mg total) by mouth daily    Quantity: 30 tablet    Medication: atorvastatin (LIPITOR) 80 mg tablet      Dose/Frequency: Take 1 tablet (80 mg total) by mouth every evening     Quantity: 30 tablet    Medication: aspirin 81 mg chewable tablet     Dose/Frequency: Chew 1 tablet (81 mg total) daily     Quantity: 30 tablet    Pharmacy: [id number] [address] [phone]    Does the patient have enough for 3 days?   [] Yes   [x] No - Send as HP to POD

## 2025-02-17 PROBLEM — I25.10 CORONARY ARTERY DISEASE INVOLVING NATIVE CORONARY ARTERY OF NATIVE HEART WITHOUT ANGINA PECTORIS: Status: ACTIVE | Noted: 2025-02-17

## 2025-02-19 DIAGNOSIS — I25.10 CORONARY ARTERY DISEASE INVOLVING NATIVE CORONARY ARTERY OF NATIVE HEART WITHOUT ANGINA PECTORIS: ICD-10-CM

## 2025-02-19 DIAGNOSIS — I21.02 ST ELEVATION MYOCARDIAL INFARCTION INVOLVING LEFT ANTERIOR DESCENDING (LAD) CORONARY ARTERY (HCC): ICD-10-CM

## 2025-02-19 DIAGNOSIS — I16.1 HYPERTENSIVE EMERGENCY: ICD-10-CM

## 2025-02-21 RX ORDER — ATORVASTATIN CALCIUM 80 MG/1
80 TABLET, FILM COATED ORAL EVERY EVENING
Qty: 90 TABLET | Refills: 1 | Status: SHIPPED | OUTPATIENT
Start: 2025-02-21

## 2025-02-21 RX ORDER — METOPROLOL TARTRATE 25 MG/1
25 TABLET, FILM COATED ORAL 2 TIMES DAILY
Qty: 180 TABLET | Refills: 1 | Status: SHIPPED | OUTPATIENT
Start: 2025-02-21

## 2025-02-21 RX ORDER — CLOPIDOGREL BISULFATE 75 MG/1
75 TABLET ORAL DAILY
Qty: 90 TABLET | Refills: 1 | Status: SHIPPED | OUTPATIENT
Start: 2025-02-21

## 2025-03-04 DIAGNOSIS — I25.10 CORONARY ARTERY DISEASE INVOLVING NATIVE CORONARY ARTERY OF NATIVE HEART WITHOUT ANGINA PECTORIS: ICD-10-CM

## 2025-03-04 DIAGNOSIS — I21.02 ST ELEVATION MYOCARDIAL INFARCTION INVOLVING LEFT ANTERIOR DESCENDING (LAD) CORONARY ARTERY (HCC): ICD-10-CM

## 2025-03-04 NOTE — TELEPHONE ENCOUNTER
Medication: clopidogrel (Plavix)    Dose/Frequency: 75mg / once daily    Quantity: 90 days    Pharmacy: [id number] - [place] [id number] [facility] [[id number]]     Office:   [] PCP/Provider -   [x] Speciality/Provider - Dr. Navarro    Does the patient have enough for 3 days?   [x] Yes   [] No - Send as HP to POD

## 2025-03-06 RX ORDER — CLOPIDOGREL BISULFATE 75 MG/1
75 TABLET ORAL DAILY
Qty: 90 TABLET | Refills: 1 | OUTPATIENT
Start: 2025-03-06

## 2025-04-10 DIAGNOSIS — I25.10 CORONARY ARTERY DISEASE INVOLVING NATIVE CORONARY ARTERY OF NATIVE HEART WITHOUT ANGINA PECTORIS: ICD-10-CM

## 2025-04-10 RX ORDER — LISINOPRIL 5 MG/1
5 TABLET ORAL DAILY
Qty: 90 TABLET | Refills: 1 | Status: SHIPPED | OUTPATIENT
Start: 2025-04-10 | End: 2025-04-18 | Stop reason: SDUPTHER

## 2025-04-17 NOTE — PROGRESS NOTES
Moises Dunn  1975  [id number]  [facility] CARDIOLOGY [address] [id number] [phone]    1. Coronary artery disease involving native coronary artery of native heart without angina pectoris  lisinopril (ZESTRIL) 5 mg tablet    Lipid Panel With Direct LDL    Echo complete w/ contrast if indicated      2. Heart failure with mid-range ejection fraction (HFmEF) ([facility])        3. Tobacco abuse          Assessment/Plan  TODAY (04/18/25):   He is overall stable from a heart standpoint. BP well controlled, continue current medications.   Will order repeat TTE now that he has been on these cardiac medications for almost a year.   Instructed him to continue with DAPT until instructed by cardiology.  Will check updated lipid panel   RTO 10/15/25 with Dr. Navarro   Coronary artery disease  - STEMI 06/15/24: s/p PCI/MARIA VICTORIA with placement of 2.5 x 34 mm to the mid LAD   - current rx: Plavix 75 mg daily and ASA 81 mg daily   HFmEF, etiology ICM  - TTE 06/16/24: LVEF 45% anterior and apical hypokinesis    - Neurohormonal Blockade:   -- beta blocker: Lopressor 25 mg BID  -- ACE/ARB/ARNi: lisinopril 5 mg daily   - aldosterone antagonist: none  -- diuretic: none  Hyperlipidemia  - lipid panel 06/16/24: cholesterol 163, triglycerides 124, HDL 53, LDL 86  - current rx: atorvastatin 80 mg daily   Tobacco use     Interval History:   This is a 50 y/o male with a PMH of CAD s/p PCI/MARIA VICTORIA, HFmEF, HLD, and tobacco use who is presenting today for follow up. He was last seen in office 07/16/24 by Dr. Navarro following his STEMI. He was doing overall well and had no cardiac concerns at this visit.     Moises has been doing really well since he was last seen in the office. Continues to stay active and exercise and denies any anginal chest pain, SOB, FISH. Taking all his medications as directed. Only concerns is allergies today from the season changes. He has changes his diet around [facility] and is eating  more lean meats and low sodium diet. His mom recently has a coronary event so he has a lot of support with her. Recently went through a divorce.    No past medical history on file.  Social History     Socioeconomic History    Marital status:      Spouse name: Not on file    Number of children: Not on file    Years of education: Not on file    Highest education level: Not on file   Occupational History    Not on file   Tobacco Use    Smoking status: Some Days     Types: Cigarettes, Cigars    Smokeless tobacco: Never   Substance and Sexual Activity    Alcohol use: Yes    Drug use: Never    Sexual activity: Not on file   Other Topics Concern    Not on file   Social History Narrative    Not on file     Social Drivers of Health     Financial Resource Strain: Not on file   Food Insecurity: No Food Insecurity (6/17/2024)    Nursing - Inadequate Food Risk Classification     Worried About Running Out of Food in the Last Year: Never true     Ran Out of Food in the Last Year: Never true     Ran Out of Food in the Last Year: Not on file   Transportation Needs: No Transportation Needs (6/17/2024)    PRAPARE - Transportation     Lack of Transportation (Medical): No     Lack of Transportation (Non-Medical): No   Physical Activity: Not on file   Stress: Not on file   Social Connections: Not on file   Intimate Partner Violence: Not on file   Housing Stability: Low Risk  (6/17/2024)    Housing Stability Vital Sign     Unable to Pay for Housing in the Last Year: No     Number of Times Moved in the Last Year: 0     Homeless in the Last Year: No      No family history on file.  Past Surgical History:   Procedure Laterality Date    CARDIAC CATHETERIZATION Left 6/15/2024    Procedure: Cardiac pci;  Surgeon: Gino Esquivel MD;  Location: [facility];  Service: Cardiology    CARDIAC CATHETERIZATION  6/15/2024    Procedure: Cardiac catheterization;  Surgeon: Gino Esquivel MD;  Location: [facility];  Service:  Cardiology    CARDIAC CATHETERIZATION N/A 6/15/2024    Procedure: Cardiac Coronary Angiogram;  Surgeon: Gino Esquivel MD;  Location: [facility];  Service: Cardiology       Current Outpatient Medications:     aspirin 81 mg chewable tablet, Chew 1 tablet (81 mg total) daily, Disp: 90 tablet, Rfl: 1    atorvastatin (LIPITOR) 80 mg tablet, take 1 tablet by mouth every evening, Disp: 90 tablet, Rfl: 1    clopidogrel (PLAVIX) 75 mg tablet, take 1 tablet by mouth once daily, Disp: 90 tablet, Rfl: 1    fexofenadine (ALLEGRA) 60 MG tablet, Take 30 mg by mouth daily, Disp: , Rfl:     fluticasone (FLONASE) 50 mcg/act nasal spray, 2 sprays into each nostril continuous as needed for rhinitis, Disp: , Rfl:     lisinopril (ZESTRIL) 5 mg tablet, Take 1 tablet (5 mg total) by mouth daily, Disp: 90 tablet, Rfl: 1    metoprolol tartrate (LOPRESSOR) 25 mg tablet, take 1 tablet by mouth twice a day, Disp: 180 tablet, Rfl: 1  No Known Allergies    Labs:     Chemistry        Component Value Date/Time    K 3.4 (L) 06/17/2024 0722     06/17/2024 0722    CO2 22 06/17/2024 0722    BUN 11 06/17/2024 0722    CREATININE 0.81 06/17/2024 0722        Component Value Date/Time    CALCIUM 8.8 06/17/2024 0722    ALKPHOS 62 06/17/2024 0722     (H) 06/17/2024 0722    ALT 36 06/17/2024 0722        Lab Results   Component Value Date    HDL 52 06/16/2024     Lab Results   Component Value Date    LDLCALC 86 06/16/2024     Lab Results   Component Value Date    TRIG 124 06/16/2024     Imaging: No results found.      Review of Systems   Constitutional: Negative for chills, diaphoresis, fever, malaise/fatigue and weight gain.   Cardiovascular:  Negative for chest pain, dyspnea on exertion, irregular heartbeat, leg swelling, near-syncope, orthopnea, palpitations, paroxysmal nocturnal dyspnea and syncope.   Respiratory:  Negative for cough, shortness of breath, sleep disturbances due to breathing, snoring and wheezing.    Skin:  Negative  "for rash.   Gastrointestinal:  Negative for bloating, abdominal pain and nausea.   Neurological:  Negative for dizziness and light-headedness.       Vitals:    04/18/25 1337   BP: 116/62   Pulse: 58   SpO2: 100%     Vitals:    04/18/25 1337   Weight: 93.3 kg (205 lb 9.6 oz)     Height: 5' 10\" (177.8 cm)   Body mass index is 29.5 kg/m².    Physical Exam  Vitals and nursing note reviewed.   Constitutional:       General: He is not in acute distress.     Appearance: Normal appearance. He is not ill-appearing.   HENT:      Head: Normocephalic and atraumatic.      Nose: Nose normal.      Mouth/Throat:      Mouth: Mucous membranes are moist.   Eyes:      Conjunctiva/sclera: Conjunctivae normal.   Cardiovascular:      Rate and Rhythm: Normal rate and regular rhythm.      Pulses:           Radial pulses are 2+ on the right side and 2+ on the left side.      Heart sounds: S1 normal and S2 normal. No murmur heard.  Pulmonary:      Effort: Pulmonary effort is normal. No respiratory distress.      Breath sounds: Normal breath sounds. No stridor. No wheezing, rhonchi or rales.   Abdominal:      General: There is no distension.   Musculoskeletal:      Cervical back: Neck supple.      Right lower leg: No edema.      Left lower leg: No edema.   Skin:     General: Skin is warm.      Capillary Refill: Capillary refill takes less than 2 seconds.   Neurological:      General: No focal deficit present.      Mental Status: He is alert.   Psychiatric:         Thought Content: Thought content normal.          "

## 2025-04-18 ENCOUNTER — OFFICE VISIT (OUTPATIENT)
Dept: CARDIOLOGY CLINIC | Facility: CLINIC | Age: 50
End: 2025-04-18
Payer: COMMERCIAL

## 2025-04-18 VITALS
SYSTOLIC BLOOD PRESSURE: 116 MMHG | DIASTOLIC BLOOD PRESSURE: 62 MMHG | HEART RATE: 58 BPM | HEIGHT: 70 IN | OXYGEN SATURATION: 100 % | WEIGHT: 205.6 LBS | BODY MASS INDEX: 29.43 KG/M2

## 2025-04-18 DIAGNOSIS — I50.22 HEART FAILURE WITH MID-RANGE EJECTION FRACTION (HFMEF) (HCC): ICD-10-CM

## 2025-04-18 DIAGNOSIS — Z72.0 TOBACCO ABUSE: ICD-10-CM

## 2025-04-18 DIAGNOSIS — I25.10 CORONARY ARTERY DISEASE INVOLVING NATIVE CORONARY ARTERY OF NATIVE HEART WITHOUT ANGINA PECTORIS: Primary | ICD-10-CM

## 2025-04-18 PROCEDURE — 99214 OFFICE O/P EST MOD 30 MIN: CPT

## 2025-04-18 RX ORDER — LISINOPRIL 5 MG/1
5 TABLET ORAL DAILY
Qty: 90 TABLET | Refills: 1 | Status: SHIPPED | OUTPATIENT
Start: 2025-04-18

## 2025-07-16 NOTE — ED NOTES
Patient transported to cath with RN on monitor.  Report given to Luis Manuel [facility] cath lab     Tylor Rosenbaum RN  06/15/24 2023     Fellow

## (undated) DEVICE — CATH GUIDE LAUNCHER 6FR EBU 3.5

## (undated) DEVICE — TR BAND RADIAL ARTERY COMPRESSION DEVICE: Brand: TR BAND

## (undated) DEVICE — Device

## (undated) DEVICE — GUIDEWIRE .035 260CM 3MM J TIP

## (undated) DEVICE — BALLOON EUPHORA RX 2.5 X 15MM

## (undated) DEVICE — RUNTHROUGH NS EXTRA FLOPPY PTCA GUIDEWIRE: Brand: RUNTHROUGH

## (undated) DEVICE — CATH DIAG 5FR IMPULSE 100CM FR4

## (undated) DEVICE — GUIDEWIRE WHOLEY .035 145 CM FLOP STR TIP

## (undated) DEVICE — RADIFOCUS OPTITORQUE ANGIOGRAPHIC CATHETER: Brand: OPTITORQUE